# Patient Record
Sex: FEMALE | Race: WHITE | Employment: FULL TIME | RURAL
[De-identification: names, ages, dates, MRNs, and addresses within clinical notes are randomized per-mention and may not be internally consistent; named-entity substitution may affect disease eponyms.]

---

## 2020-01-28 ENCOUNTER — HOSPITAL ENCOUNTER (OUTPATIENT)
Dept: CT IMAGING | Facility: HOSPITAL | Age: 40
Discharge: HOME | End: 2020-01-28
Payer: COMMERCIAL

## 2020-01-28 PROCEDURE — 74150 CT ABDOMEN W/O CONTRAST: CPT

## 2020-12-17 ENCOUNTER — PREP FOR SURGERY (OUTPATIENT)
Dept: OTHER | Facility: HOSPITAL | Age: 40
End: 2020-12-17

## 2020-12-17 ENCOUNTER — APPOINTMENT (OUTPATIENT)
Dept: PREADMISSION TESTING | Facility: HOSPITAL | Age: 40
End: 2020-12-17

## 2020-12-17 ENCOUNTER — TELEPHONE (OUTPATIENT)
Dept: OBSTETRICS AND GYNECOLOGY | Facility: CLINIC | Age: 40
End: 2020-12-17

## 2020-12-17 ENCOUNTER — OFFICE VISIT (OUTPATIENT)
Dept: OBSTETRICS AND GYNECOLOGY | Facility: CLINIC | Age: 40
End: 2020-12-17

## 2020-12-17 VITALS
WEIGHT: 280 LBS | DIASTOLIC BLOOD PRESSURE: 76 MMHG | BODY MASS INDEX: 47.8 KG/M2 | SYSTOLIC BLOOD PRESSURE: 138 MMHG | HEIGHT: 64 IN

## 2020-12-17 VITALS — WEIGHT: 281.8 LBS | BODY MASS INDEX: 48.11 KG/M2 | HEIGHT: 64 IN

## 2020-12-17 DIAGNOSIS — E66.9 OBESITY (BMI 30-39.9): ICD-10-CM

## 2020-12-17 DIAGNOSIS — N94.6 DYSMENORRHEA: ICD-10-CM

## 2020-12-17 DIAGNOSIS — N92.1 MENORRHAGIA WITH IRREGULAR CYCLE: ICD-10-CM

## 2020-12-17 DIAGNOSIS — N95.1 MENOPAUSAL SYMPTOMS: ICD-10-CM

## 2020-12-17 DIAGNOSIS — R93.5 ABNORMAL ULTRASOUND OF ENDOMETRIUM: ICD-10-CM

## 2020-12-17 DIAGNOSIS — N92.1 MENORRHAGIA WITH IRREGULAR CYCLE: Primary | ICD-10-CM

## 2020-12-17 LAB
BASOPHILS # BLD AUTO: 0.12 10*3/MM3 (ref 0–0.2)
BASOPHILS NFR BLD AUTO: 0.8 % (ref 0–1.5)
DEPRECATED RDW RBC AUTO: 43.6 FL (ref 37–54)
EOSINOPHIL # BLD AUTO: 0.49 10*3/MM3 (ref 0–0.4)
EOSINOPHIL NFR BLD AUTO: 3.3 % (ref 0.3–6.2)
ERYTHROCYTE [DISTWIDTH] IN BLOOD BY AUTOMATED COUNT: 14.5 % (ref 12.3–15.4)
HCT VFR BLD AUTO: 30.5 % (ref 34–46.6)
HGB BLD-MCNC: 9.7 G/DL (ref 12–15.9)
IMM GRANULOCYTES # BLD AUTO: 0.11 10*3/MM3 (ref 0–0.05)
IMM GRANULOCYTES NFR BLD AUTO: 0.7 % (ref 0–0.5)
LYMPHOCYTES # BLD AUTO: 4.23 10*3/MM3 (ref 0.7–3.1)
LYMPHOCYTES NFR BLD AUTO: 28.1 % (ref 19.6–45.3)
MCH RBC QN AUTO: 26.2 PG (ref 26.6–33)
MCHC RBC AUTO-ENTMCNC: 31.8 G/DL (ref 31.5–35.7)
MCV RBC AUTO: 82.4 FL (ref 79–97)
MONOCYTES # BLD AUTO: 0.81 10*3/MM3 (ref 0.1–0.9)
MONOCYTES NFR BLD AUTO: 5.4 % (ref 5–12)
NEUTROPHILS NFR BLD AUTO: 61.7 % (ref 42.7–76)
NEUTROPHILS NFR BLD AUTO: 9.27 10*3/MM3 (ref 1.7–7)
NRBC BLD AUTO-RTO: 0 /100 WBC (ref 0–0.2)
PLATELET # BLD AUTO: 733 10*3/MM3 (ref 140–450)
PMV BLD AUTO: 10.2 FL (ref 6–12)
RBC # BLD AUTO: 3.7 10*6/MM3 (ref 3.77–5.28)
WBC # BLD AUTO: 15.03 10*3/MM3 (ref 3.4–10.8)

## 2020-12-17 PROCEDURE — U0004 COV-19 TEST NON-CDC HGH THRU: HCPCS

## 2020-12-17 PROCEDURE — 99204 OFFICE O/P NEW MOD 45 MIN: CPT | Performed by: OBSTETRICS & GYNECOLOGY

## 2020-12-17 PROCEDURE — 85025 COMPLETE CBC W/AUTO DIFF WBC: CPT

## 2020-12-17 PROCEDURE — 81003 URINALYSIS AUTO W/O SCOPE: CPT

## 2020-12-17 PROCEDURE — C9803 HOPD COVID-19 SPEC COLLECT: HCPCS

## 2020-12-17 PROCEDURE — 36415 COLL VENOUS BLD VENIPUNCTURE: CPT

## 2020-12-17 PROCEDURE — 81025 URINE PREGNANCY TEST: CPT

## 2020-12-17 RX ORDER — SODIUM CHLORIDE 0.9 % (FLUSH) 0.9 %
10 SYRINGE (ML) INJECTION AS NEEDED
Status: CANCELLED | OUTPATIENT
Start: 2020-12-17

## 2020-12-17 RX ORDER — SODIUM CHLORIDE 0.9 % (FLUSH) 0.9 %
3 SYRINGE (ML) INJECTION EVERY 12 HOURS SCHEDULED
Status: CANCELLED | OUTPATIENT
Start: 2020-12-17

## 2020-12-17 NOTE — DISCHARGE INSTRUCTIONS

## 2020-12-17 NOTE — PROGRESS NOTES
Subjective  Chief Complaint   Patient presents with   • Menorrhagia     Patient complains of heavy menstrual cycle, advised changing pad and tampon every hour.      Patient is 40 y.o.  here as a new patient for evaluation of heavy and irregular menses.  Patient reports losing approximately 55 pounds since 2019.  Patient has done this on her own through diet.  Patient reports prior to this time she would go for several months without a menstrual cycle.  Patient reports now since that time she will have menstrual cycles monthly or occasionally every 2 weeks.  Patient reports her cycles will vary in the length of time from 3 to 4 days up to a week or more.  Patient has had an episode of bleeding lasting for 2 weeks.  Patient reports her menses are heavy passing large clots.  She will change a tampon and maxi pad every 1-2 hours.  She also reports having severe pain associated with her menstrual cycles.  Patient reports the onset of symptoms again over the last year.  Patient is currently not on any contraception.  She has not been on any birth control for over 20 years.  Patient has had 1 vaginal delivery.  Patient lost her 18-year-old daughter in  secondary to an MVA.  Patient does report having spherocytosis as well.  She was diagnosed at the age of 3.  She had a splenectomy.  Patient has done well since that time.  Patient has had a D&C in the past secondary to menorrhagia.  Patient reports benign pathology.  Patient reports her last Pap smear was over 5 years ago.  She reports her Pap smears have been normal.  Patient denies being dizzy or lightheaded.  Patient reports her last menstrual cycle started on .  Patient reports the bleeding has been extremely heavy passing large clots as well as changing a pad and tampon every hour.  The patient does report having occasional hot flashes as well as night sweats.  She has not had any recent laboratory assessment.  The patient denies any  "family history of GYN malignancy.    History  Past Medical History:   Diagnosis Date   • Female infertility    • Migraine    • Spherocytosis (CMS/HCC)    • Urinary tract infection      No current outpatient medications on file prior to visit.     No current facility-administered medications on file prior to visit.      No Known Allergies  Past Surgical History:   Procedure Laterality Date   • DILATATION AND CURETTAGE     • GALLBLADDER SURGERY     • SPLENECTOMY       History reviewed. No pertinent family history.  Social History     Socioeconomic History   • Marital status: Single     Spouse name: Not on file   • Number of children: Not on file   • Years of education: Not on file   • Highest education level: Not on file   Social Needs   • Financial resource strain: Patient refused   • Food insecurity     Worry: Patient refused     Inability: Patient refused   • Transportation needs     Medical: Not on file     Non-medical: Not on file   Tobacco Use   • Smoking status: Never Smoker   • Smokeless tobacco: Never Used   Substance and Sexual Activity   • Alcohol use: Never     Frequency: Never   • Drug use: Never   • Sexual activity: Yes     Partners: Male     Birth control/protection: None   Lifestyle   • Physical activity     Days per week: Patient refused     Minutes per session: Patient refused   • Stress: Patient refused       Review of Systems  All systems were reviewed and negative except for:  Constitution:  positive for weight gain  Genitourinary: postivie for  abnormal menstrual bleeding  Objective  Vitals:    12/17/20 1039   BP: 138/76   Weight: 127 kg (280 lb)   Height: 162.6 cm (64\")     Physical Exam:  General Appearance: alert, appears stated age and cooperative  Head: normocephalic, without obvious abnormality and atraumatic  Eyes: lids and lashes normal, conjunctivae and sclerae normal, no icterus, no pallor, corneas clear and PERRLA  Ears: ears appear intact with no abnormalities noted  Nose: nares " normal, septum midline, mucosa normal and no drainage  Neck: suppple, trachea midline and no thyromegaly  Lungs: clear to auscultation, respirations regular, respirations even and respirations unlabored  Heart: regular rhythm and normal rate, normal S1, S2, no murmur, gallop, or rubs and no click  Breasts: Not performed.  Abdomen: normal bowel sounds, no masses, no hepatomegaly, no splenomegaly, soft non-tender, no guarding and no rebound tenderness  Pelvic: Not performed.  Extremities: moves extremities well, no edema, no cyanosis and no redness  Skin: no bleeding, bruising or rash and no lesions noted  Lymph Nodes: no palpable adenopathy  Neuro: CN II-X grossly intact; sensation intact  Psych: normal mood and affect, oriented to person, time and place, thought content organized and appropriate judgment  Lab Review   No data reviewed    Imaging   Pelvic ultrasound report  Pelvic ultrasound images independantly reviewed; see report as noted  US Non-ob Transvaginal  Destiny Soto  : 1980  MRN: [id number]  Date: 2020    Reason for exam/History: Menorrhagia    The ultrasound images are reviewed.  The uterus is anteverted in position.    The uterus appears normal.  The endometrium is markedly thickened and   heterogeneous.  The endometrium measures 30 mms.  The bilateral ovaries   are normal in appearance.  The ovaries are difficult to image however.    There is normal vascular flow noted.  There is no free fluid noted.    The exam limitations noted:  image quality    See ultrasound report for measurements and structures identified.    Jacquelin Hogue MD, [facility]  OB GYN [place]    Decision to Obtain Medical Records  No    Summary of Medical Records  No    Assessment/Plan  1. Menorrhagia with irregular cycle  Patient with new onset menorrhagia with irregular cycles.  Patient with morbid obesity and a history of oligomenorrhea.  Transvaginal ultrasound was obtained today.  The  endometrium is noted to be markedly thickened.  I discussed with the patient the need for endometrial sampling.  I discussed with the patient and endometrial biopsy versus D&C.  Patient desires to proceed with D&C and diagnostic hysteroscopy.  Plan pending results.  - US Non-ob Transvaginal    2. Abnormal ultrasound of endometrium  Patient with markedly thickened endometrium on transvaginal ultrasound.  I have discussed with the patient the various etiologies for an abnormal endometrium.  I recommend further evaluation with a D&C and diagnostic hysteroscopy.  I discussed with the patient the risk, complications, benefits, as well as other alternatives.  Patient to schedule as noted.    3. Dysmenorrhea  Destiny Soto was counseled regarding the various etiologies for dysmenorrhea.  The patient was informed that primary dysmenorrhea is painful menstruation in the absence of pathology.  The various options for dysmenorrhea were discussed to include nonsteroidal antiinflammatory drugs, hormonal suppression, or both.  The patient was informed secondary dysmenorrhea is a result of pelvic pathology and is more common in patients with severe dysmenorrhea at menarche or progressively worsening dysmenorrhea, abnormal uterine bleeding, mid-cycle or acyclic pain, infertility, family history of endometriosis, dyspareunia, or lack of response to empiric therapy.  Evaluation for secondary causes includes pelvic ultrasonography and possible laparoscopy.  The various treatment options for secondary dysmenorrhea depends upon the etiology as discussed.  Transvaginal ultrasound was obtained as noted.  We will proceed with D&C and diagnostic hysteroscopy for further evaluation.  Plan pending results.    4. Obesity (BMI 30-39.9)  Patient with a history of oligomenorrhea and morbid obesity.  Patient with recent weight loss as noted.  I discussed with the patient her increased risk of uterine hyperplasia as well as malignancy.  We will  schedule D&C with diagnostic hysteroscopy for further evaluation.    5. Menopausal symptoms  The various options for the management of menopausal symptoms was discussed.  The medical treatment options discussed include HRT, SSRIs, SSNRIs, clonidine, and gabapentin.  The risks and benefits were discussed including the findings from the WHI study.  The increased risk of breast cancer, CAD, stroke, and VTE events were discussed for combination therapy vs the increased risk of CV events and breast cancer not being seen in the estrogen only group.  The lowest effective dose for the shortest duration of treatment was discussed in regards to HRT.  Other alternatives including otc supplements and lifestyle changes were also discussed.  Local estrogen therapy to relieve atrophic vaginal symptoms was discussed was well as other alternatives.  Will obtain labs at the time of her preop testing as discussed.  Plan pending results.         Follow up as discussed/scheduled  Note: Speech recognition transcription software may have been used to dictate portions of this document.  An attempt at proofreading has been made though minor errors in transcription may still be present.  This note was electronically signed.  Jacquelin Hogue M.D.

## 2020-12-17 NOTE — TELEPHONE ENCOUNTER
----- Message from Chelsea Frost sent at 12/17/2020  2:32 PM EST -----  Regarding: pre asmissions PRE OP ORDER  Patient gave a urine on her pre op today and it spilled.    Is it okay to collect it on Monday the 21st , Patient lives out of town.    Thanks,  Chelsea

## 2020-12-18 ENCOUNTER — LAB (OUTPATIENT)
Dept: LAB | Facility: HOSPITAL | Age: 40
End: 2020-12-18

## 2020-12-18 LAB
B-HCG UR QL: NEGATIVE
BILIRUB UR QL STRIP: NEGATIVE
CLARITY UR: CLEAR
COLOR UR: YELLOW
DEPRECATED RDW RBC AUTO: 43.5 FL (ref 37–54)
ERYTHROCYTE [DISTWIDTH] IN BLOOD BY AUTOMATED COUNT: 14.6 % (ref 12.3–15.4)
GLUCOSE UR STRIP-MCNC: NEGATIVE MG/DL
HCT VFR BLD AUTO: 28.2 % (ref 34–46.6)
HGB BLD-MCNC: 9 G/DL (ref 12–15.9)
HGB UR QL STRIP.AUTO: NEGATIVE
KETONES UR QL STRIP: NEGATIVE
LEUKOCYTE ESTERASE UR QL STRIP.AUTO: NEGATIVE
MCH RBC QN AUTO: 26.2 PG (ref 26.6–33)
MCHC RBC AUTO-ENTMCNC: 31.9 G/DL (ref 31.5–35.7)
MCV RBC AUTO: 82.2 FL (ref 79–97)
NITRITE UR QL STRIP: NEGATIVE
PH UR STRIP.AUTO: 7 [PH] (ref 5–8)
PLATELET # BLD AUTO: 736 10*3/MM3 (ref 140–450)
PMV BLD AUTO: 9.9 FL (ref 6–12)
PROT UR QL STRIP: NEGATIVE
RBC # BLD AUTO: 3.43 10*6/MM3 (ref 3.77–5.28)
SARS-COV-2 RNA RESP QL NAA+PROBE: NOT DETECTED
SP GR UR STRIP: 1.01 (ref 1–1.03)
UROBILINOGEN UR QL STRIP: NORMAL
WBC # BLD AUTO: 13.13 10*3/MM3 (ref 3.4–10.8)

## 2020-12-18 PROCEDURE — 85027 COMPLETE CBC AUTOMATED: CPT | Performed by: OBSTETRICS & GYNECOLOGY

## 2020-12-18 PROCEDURE — 81003 URINALYSIS AUTO W/O SCOPE: CPT | Performed by: OBSTETRICS & GYNECOLOGY

## 2020-12-18 PROCEDURE — 81025 URINE PREGNANCY TEST: CPT | Performed by: OBSTETRICS & GYNECOLOGY

## 2020-12-19 PROCEDURE — S0260 H&P FOR SURGERY: HCPCS | Performed by: OBSTETRICS & GYNECOLOGY

## 2020-12-21 ENCOUNTER — ANESTHESIA EVENT (OUTPATIENT)
Dept: PERIOP | Facility: HOSPITAL | Age: 40
End: 2020-12-21

## 2020-12-21 ENCOUNTER — ANESTHESIA (OUTPATIENT)
Dept: PERIOP | Facility: HOSPITAL | Age: 40
End: 2020-12-21

## 2020-12-21 ENCOUNTER — HOSPITAL ENCOUNTER (OUTPATIENT)
Facility: HOSPITAL | Age: 40
Setting detail: HOSPITAL OUTPATIENT SURGERY
Discharge: HOME | End: 2020-12-21
Attending: OBSTETRICS & GYNECOLOGY | Admitting: OBSTETRICS & GYNECOLOGY

## 2020-12-21 VITALS
RESPIRATION RATE: 20 BRPM | TEMPERATURE: 97.9 F | DIASTOLIC BLOOD PRESSURE: 80 MMHG | SYSTOLIC BLOOD PRESSURE: 140 MMHG | HEART RATE: 67 BPM | OXYGEN SATURATION: 97 %

## 2020-12-21 DIAGNOSIS — N92.1 MENORRHAGIA WITH IRREGULAR CYCLE: ICD-10-CM

## 2020-12-21 LAB
ESTRADIOL SERPL HS-MCNC: 27.3 PG/ML
FSH SERPL-ACNC: 5.9 MIU/ML

## 2020-12-21 PROCEDURE — 25010000002 ONDANSETRON PER 1 MG: Performed by: NURSE ANESTHETIST, CERTIFIED REGISTERED

## 2020-12-21 PROCEDURE — 82670 ASSAY OF TOTAL ESTRADIOL: CPT | Performed by: OBSTETRICS & GYNECOLOGY

## 2020-12-21 PROCEDURE — 25010000003 LIDOCAINE 1 % SOLUTION: Performed by: OBSTETRICS & GYNECOLOGY

## 2020-12-21 PROCEDURE — 0 LIDOCAINE 1 % SOLUTION: Performed by: OBSTETRICS & GYNECOLOGY

## 2020-12-21 PROCEDURE — 25010000002 PROPOFOL 10 MG/ML EMULSION: Performed by: NURSE ANESTHETIST, CERTIFIED REGISTERED

## 2020-12-21 PROCEDURE — 84402 ASSAY OF FREE TESTOSTERONE: CPT | Performed by: OBSTETRICS & GYNECOLOGY

## 2020-12-21 PROCEDURE — 25010000002 FENTANYL CITRATE (PF) 100 MCG/2ML SOLUTION: Performed by: NURSE ANESTHETIST, CERTIFIED REGISTERED

## 2020-12-21 PROCEDURE — 25010000002 MIDAZOLAM PER 1MG: Performed by: NURSE ANESTHETIST, CERTIFIED REGISTERED

## 2020-12-21 PROCEDURE — 83001 ASSAY OF GONADOTROPIN (FSH): CPT | Performed by: OBSTETRICS & GYNECOLOGY

## 2020-12-21 PROCEDURE — 58558 HYSTEROSCOPY BIOPSY: CPT | Performed by: OBSTETRICS & GYNECOLOGY

## 2020-12-21 PROCEDURE — 84403 ASSAY OF TOTAL TESTOSTERONE: CPT | Performed by: OBSTETRICS & GYNECOLOGY

## 2020-12-21 PROCEDURE — 25010000002 FENTANYL CITRATE (PF) 50 MCG/ML SOLUTION: Performed by: NURSE ANESTHETIST, CERTIFIED REGISTERED

## 2020-12-21 RX ORDER — PROPOFOL 10 MG/ML
VIAL (ML) INTRAVENOUS AS NEEDED
Status: DISCONTINUED | OUTPATIENT
Start: 2020-12-21 | End: 2020-12-21 | Stop reason: SURG

## 2020-12-21 RX ORDER — ONDANSETRON 2 MG/ML
INJECTION INTRAMUSCULAR; INTRAVENOUS AS NEEDED
Status: DISCONTINUED | OUTPATIENT
Start: 2020-12-21 | End: 2020-12-21 | Stop reason: SURG

## 2020-12-21 RX ORDER — SODIUM CHLORIDE 0.9 % (FLUSH) 0.9 %
3 SYRINGE (ML) INJECTION EVERY 12 HOURS SCHEDULED
Status: DISCONTINUED | OUTPATIENT
Start: 2020-12-21 | End: 2020-12-21 | Stop reason: HOSPADM

## 2020-12-21 RX ORDER — ACETAMINOPHEN 500 MG
1000 TABLET ORAL EVERY 8 HOURS PRN
Qty: 60 TABLET | Refills: 0 | Status: SHIPPED | OUTPATIENT
Start: 2020-12-21 | End: 2021-01-18 | Stop reason: HOSPADM

## 2020-12-21 RX ORDER — ONDANSETRON HYDROCHLORIDE 8 MG/1
8 TABLET, FILM COATED ORAL EVERY 8 HOURS PRN
Qty: 12 TABLET | Refills: 0 | Status: SHIPPED | OUTPATIENT
Start: 2020-12-21 | End: 2021-01-15

## 2020-12-21 RX ORDER — ONDANSETRON 2 MG/ML
4 INJECTION INTRAMUSCULAR; INTRAVENOUS ONCE AS NEEDED
Status: DISCONTINUED | OUTPATIENT
Start: 2020-12-21 | End: 2020-12-21 | Stop reason: HOSPADM

## 2020-12-21 RX ORDER — SODIUM CHLORIDE 0.9 % (FLUSH) 0.9 %
10 SYRINGE (ML) INJECTION AS NEEDED
Status: DISCONTINUED | OUTPATIENT
Start: 2020-12-21 | End: 2020-12-21 | Stop reason: HOSPADM

## 2020-12-21 RX ORDER — KETAMINE HYDROCHLORIDE 50 MG/ML
INJECTION, SOLUTION, CONCENTRATE INTRAMUSCULAR; INTRAVENOUS AS NEEDED
Status: DISCONTINUED | OUTPATIENT
Start: 2020-12-21 | End: 2020-12-21 | Stop reason: SURG

## 2020-12-21 RX ORDER — PROMETHAZINE HYDROCHLORIDE 12.5 MG/1
12.5 TABLET ORAL ONCE AS NEEDED
Status: DISCONTINUED | OUTPATIENT
Start: 2020-12-21 | End: 2020-12-21 | Stop reason: HOSPADM

## 2020-12-21 RX ORDER — FENTANYL CITRATE 50 UG/ML
INJECTION, SOLUTION INTRAMUSCULAR; INTRAVENOUS AS NEEDED
Status: DISCONTINUED | OUTPATIENT
Start: 2020-12-21 | End: 2020-12-21 | Stop reason: SURG

## 2020-12-21 RX ORDER — IBUPROFEN 800 MG/1
800 TABLET ORAL EVERY 8 HOURS PRN
Qty: 30 TABLET | Refills: 0 | Status: SHIPPED | OUTPATIENT
Start: 2020-12-21 | End: 2021-01-18 | Stop reason: HOSPADM

## 2020-12-21 RX ORDER — MIDAZOLAM HYDROCHLORIDE 2 MG/2ML
INJECTION, SOLUTION INTRAMUSCULAR; INTRAVENOUS AS NEEDED
Status: DISCONTINUED | OUTPATIENT
Start: 2020-12-21 | End: 2020-12-21 | Stop reason: SURG

## 2020-12-21 RX ORDER — LIDOCAINE HYDROCHLORIDE 10 MG/ML
INJECTION, SOLUTION INFILTRATION; PERINEURAL AS NEEDED
Status: DISCONTINUED | OUTPATIENT
Start: 2020-12-21 | End: 2020-12-21 | Stop reason: HOSPADM

## 2020-12-21 RX ORDER — LIDOCAINE HYDROCHLORIDE 20 MG/ML
INJECTION, SOLUTION INTRAVENOUS AS NEEDED
Status: DISCONTINUED | OUTPATIENT
Start: 2020-12-21 | End: 2020-12-21 | Stop reason: SURG

## 2020-12-21 RX ORDER — HYDROCODONE BITARTRATE AND ACETAMINOPHEN 5; 325 MG/1; MG/1
1 TABLET ORAL ONCE AS NEEDED
Status: DISCONTINUED | OUTPATIENT
Start: 2020-12-21 | End: 2020-12-21 | Stop reason: HOSPADM

## 2020-12-21 RX ORDER — ONDANSETRON 4 MG/1
4 TABLET, FILM COATED ORAL ONCE AS NEEDED
Status: DISCONTINUED | OUTPATIENT
Start: 2020-12-21 | End: 2020-12-21 | Stop reason: HOSPADM

## 2020-12-21 RX ORDER — SODIUM CHLORIDE, SODIUM LACTATE, POTASSIUM CHLORIDE, CALCIUM CHLORIDE 600; 310; 30; 20 MG/100ML; MG/100ML; MG/100ML; MG/100ML
1000 INJECTION, SOLUTION INTRAVENOUS CONTINUOUS
Status: DISCONTINUED | OUTPATIENT
Start: 2020-12-21 | End: 2020-12-21 | Stop reason: HOSPADM

## 2020-12-21 RX ADMIN — KETAMINE HYDROCHLORIDE 10 MG: 50 INJECTION, SOLUTION INTRAMUSCULAR; INTRAVENOUS at 13:45

## 2020-12-21 RX ADMIN — FENTANYL CITRATE 25 MCG: 50 INJECTION INTRAMUSCULAR; INTRAVENOUS at 13:32

## 2020-12-21 RX ADMIN — FENTANYL CITRATE 25 MCG: 50 INJECTION INTRAMUSCULAR; INTRAVENOUS at 13:25

## 2020-12-21 RX ADMIN — MIDAZOLAM HYDROCHLORIDE 2 MG: 1 INJECTION, SOLUTION INTRAMUSCULAR; INTRAVENOUS at 13:25

## 2020-12-21 RX ADMIN — KETAMINE HYDROCHLORIDE 30 MG: 50 INJECTION, SOLUTION INTRAMUSCULAR; INTRAVENOUS at 13:30

## 2020-12-21 RX ADMIN — LIDOCAINE HYDROCHLORIDE 80 MG: 20 INJECTION, SOLUTION INTRAVENOUS at 13:30

## 2020-12-21 RX ADMIN — PROPOFOL 70 MG: 10 INJECTION, EMULSION INTRAVENOUS at 13:30

## 2020-12-21 RX ADMIN — SODIUM CHLORIDE, POTASSIUM CHLORIDE, SODIUM LACTATE AND CALCIUM CHLORIDE 1000 ML: 600; 310; 30; 20 INJECTION, SOLUTION INTRAVENOUS at 12:12

## 2020-12-21 RX ADMIN — PROPOFOL 150 MCG/KG/MIN: 10 INJECTION, EMULSION INTRAVENOUS at 13:30

## 2020-12-21 RX ADMIN — ONDANSETRON 4 MG: 2 INJECTION INTRAMUSCULAR; INTRAVENOUS at 13:30

## 2020-12-21 RX ADMIN — FENTANYL CITRATE 50 MCG: 50 INJECTION INTRAMUSCULAR; INTRAVENOUS at 13:42

## 2020-12-22 LAB
TESTOST FREE SERPL-MCNC: 3.9 PG/ML (ref 0–4.2)
TESTOST SERPL-MCNC: 32 NG/DL (ref 8–48)

## 2020-12-24 ENCOUNTER — TELEPHONE (OUTPATIENT)
Dept: OBSTETRICS AND GYNECOLOGY | Facility: CLINIC | Age: 40
End: 2020-12-24

## 2020-12-29 LAB
LAB AP CASE REPORT: NORMAL
PATH REPORT.FINAL DX SPEC: NORMAL

## 2020-12-30 ENCOUNTER — OFFICE VISIT (OUTPATIENT)
Dept: OBSTETRICS AND GYNECOLOGY | Facility: CLINIC | Age: 40
End: 2020-12-30

## 2020-12-30 VITALS
SYSTOLIC BLOOD PRESSURE: 132 MMHG | HEIGHT: 64 IN | DIASTOLIC BLOOD PRESSURE: 80 MMHG | WEIGHT: 286 LBS | BODY MASS INDEX: 48.83 KG/M2

## 2020-12-30 DIAGNOSIS — Z09 POSTOPERATIVE FOLLOW-UP: Primary | ICD-10-CM

## 2020-12-30 DIAGNOSIS — C54.1 ENDOMETRIAL CANCER (HCC): ICD-10-CM

## 2020-12-30 DIAGNOSIS — D50.0 IRON DEFICIENCY ANEMIA DUE TO CHRONIC BLOOD LOSS: ICD-10-CM

## 2020-12-30 PROCEDURE — 99024 POSTOP FOLLOW-UP VISIT: CPT | Performed by: PHYSICIAN ASSISTANT

## 2020-12-30 RX ORDER — FERROUS SULFATE 325(65) MG
325 TABLET ORAL 2 TIMES DAILY WITH MEALS
Qty: 60 TABLET | Refills: 1 | Status: SHIPPED | OUTPATIENT
Start: 2020-12-30

## 2020-12-30 NOTE — PROGRESS NOTES
Subjective   Chief Complaint   Patient presents with   • Post-op     9 days post-op D&C Hysteroscopy, doing well       Destiny Soto is a 40 y.o. year old  presenting to be seen for post op visit  She is doing well 9 days post op D&C hysteroscopy. Reports having normal bowel and bladder function  Has had light vaginal spotting off and on since procedure  Pathology notes endometrioid carcinoma FIGO 2  Reports she is still feeling somewhat weak. She is not taking iron supplement. Hct 28 preoperatively       Past Medical History:   Diagnosis Date   • Female infertility    • Migraine    • Spherocytosis (CMS/HCC)    • Urinary tract infection         Current Outpatient Medications:   •  ibuprofen (ADVIL,MOTRIN) 800 MG tablet, Take 1 tablet by mouth Every 8 (Eight) Hours As Needed for Mild Pain ., Disp: 30 tablet, Rfl: 0  •  acetaminophen (TYLENOL) 500 MG tablet, Take 2 tablets by mouth Every 8 (Eight) Hours As Needed for Mild or Moderate Pain ., Disp: 60 tablet, Rfl: 0  •  ferrous sulfate 325 (65 FE) MG tablet, Take 1 tablet by mouth 2 (Two) Times a Day With Meals., Disp: 60 tablet, Rfl: 1  •  ondansetron (ZOFRAN) 8 MG tablet, Take 1 tablet by mouth Every 8 (Eight) Hours As Needed for Nausea or Vomiting., Disp: 12 tablet, Rfl: 0   No Known Allergies   Past Surgical History:   Procedure Laterality Date   • D&C HYSTEROSCOPY N/A 2020    Procedure: DILATATION AND CURETTAGE  DIAGNOSTIC HYSTEROSCOPY;  Surgeon: Jacquelin Hogue MD;  Location: [facility];  Service: Obstetrics/Gynecology;  Laterality: N/A;   • DILATATION AND CURETTAGE     • GALLBLADDER SURGERY     • SPLENECTOMY        Social History     Socioeconomic History   • Marital status: Single     Spouse name: Not on file   • Number of children: Not on file   • Years of education: Not on file   • Highest education level: Not on file   Social Needs   • Financial resource strain: Patient refused   • Food insecurity     Worry: Patient refused     Inability: Patient  "refused   • Transportation needs     Medical: Not on file     Non-medical: Not on file   Tobacco Use   • Smoking status: Never Smoker   • Smokeless tobacco: Never Used   Substance and Sexual Activity   • Alcohol use: Never     Frequency: Never   • Drug use: Never   • Sexual activity: Yes     Partners: Male     Birth control/protection: None   Lifestyle   • Physical activity     Days per week: Patient refused     Minutes per session: Patient refused   • Stress: Patient refused      History reviewed. No pertinent family history.    Review of Systems   Constitutional: Positive for fatigue. Negative for chills, diaphoresis and fever.   Gastrointestinal: Negative for abdominal pain, constipation, diarrhea, nausea and vomiting.   Genitourinary: Negative for difficulty urinating, dysuria, pelvic pain and vaginal discharge.           Objective   /80   Ht 162.6 cm (64\")   Wt 130 kg (286 lb)   LMP 12/13/2020 (Approximate)   Breastfeeding No   BMI 49.09 kg/m²     Physical Exam         Assessment and Plan  Diagnoses and all orders for this visit:    1. Postoperative follow-up (Primary)    2. Endometrial cancer (CMS/HCC)  -     Ambulatory Referral to Gynecologic Oncology    3. Iron deficiency anemia due to chronic blood loss    Other orders  -     ferrous sulfate 325 (65 FE) MG tablet; Take 1 tablet by mouth 2 (Two) Times a Day With Meals.  Dispense: 60 tablet; Refill: 1      Patient Instructions   Refer GYN oncology  Recommend starting iron replacement bid with meals             This note was electronically signed.    Val Kc PA-C   December 30, 2020  "

## 2021-01-06 ENCOUNTER — PATIENT EDUCATION (SURGERY INSTRUCTIONS) (OUTPATIENT)
Dept: GYNECOLOGIC ONCOLOGY | Facility: CLINIC | Age: 41
End: 2021-01-06

## 2021-01-06 ENCOUNTER — OFFICE VISIT (OUTPATIENT)
Dept: GYNECOLOGIC ONCOLOGY | Facility: CLINIC | Age: 41
End: 2021-01-06

## 2021-01-06 VITALS
SYSTOLIC BLOOD PRESSURE: 145 MMHG | WEIGHT: 286 LBS | BODY MASS INDEX: 48.83 KG/M2 | HEART RATE: 82 BPM | DIASTOLIC BLOOD PRESSURE: 78 MMHG | TEMPERATURE: 96.4 F | HEIGHT: 64 IN | RESPIRATION RATE: 18 BRPM | OXYGEN SATURATION: 97 %

## 2021-01-06 DIAGNOSIS — F41.9 ANXIETY: ICD-10-CM

## 2021-01-06 DIAGNOSIS — C54.1 ENDOMETRIAL CANCER (HCC): Primary | ICD-10-CM

## 2021-01-06 DIAGNOSIS — E66.01 CLASS 3 SEVERE OBESITY DUE TO EXCESS CALORIES WITHOUT SERIOUS COMORBIDITY WITH BODY MASS INDEX (BMI) OF 45.0 TO 49.9 IN ADULT (HCC): ICD-10-CM

## 2021-01-06 DIAGNOSIS — F32.9 REACTIVE DEPRESSION: ICD-10-CM

## 2021-01-06 DIAGNOSIS — N92.1 MENORRHAGIA WITH IRREGULAR CYCLE: ICD-10-CM

## 2021-01-06 PROCEDURE — 99205 OFFICE O/P NEW HI 60 MIN: CPT | Performed by: OBSTETRICS & GYNECOLOGY

## 2021-01-06 RX ORDER — CELECOXIB 100 MG/1
200 CAPSULE ORAL ONCE
Status: CANCELLED | OUTPATIENT
Start: 2021-01-06 | End: 2021-01-06

## 2021-01-06 RX ORDER — ACETAMINOPHEN 325 MG/1
650 TABLET ORAL ONCE
Status: CANCELLED | OUTPATIENT
Start: 2021-01-06 | End: 2021-01-06

## 2021-01-06 NOTE — PATIENT INSTRUCTIONS
Laparoscopic Surgery Instructions            Destiny Soto  [id number]  1980      SURGEON:  Ruth Mistry MD    Surgery Coordinator: Ashley   If you have any questions before or after surgery please call.  [phone]       Appointment    1. You have COVID testing on 01/15/2021 at 2:30 PM This is located at [address] [address] located in the lower level of the building ( basement ). Upon arrival please park in the designated parking spaces located to the left of the building. Once parked, please stay in your car and call [phone]. A member of the clinic will conduct your screening before leaving your vehicle.     2. You have pre-admission testing (PAT) appointment on 01/15/2021 at 3:30 PM.You will need to be at hospital registration 10 minutes before that time. The registration department is located in the long hallway between the [facility] and [address] [place] [address].      3.  Your surgery has been scheduled on 01/18/2021.  You will need to be at the [address] [place] [address] second floor surgery registration on that day at 08:00 AM    The Day(s) Before Surgery     ·  Nothing by mouth after midnight on 01/17/2021.    · Plan to have someone take you home from the hospital.    · Do not use any products that contain nicotine or tobacco, such as cigarettes and e-cigarettes. These can delay healing after surgery. If you need help quitting, ask your health care provider.    ·  Do not take vitamins or aspirin one week before surgery ( if applicable).  On the morning of your surgery, you may take you prescription medications with a sip of water, unless told otherwise by your provider.  Bring them with you to the hospital (Diabetic patient should bring insulin if instructed by the managing physician). If you are taking a blood thinner ( Eliquis, Coumadin, Xarelto, Lovenox, Asprin, Heparin, etc.) please have the provider that manages this instruct you on when to stop taking prior to surgery.     · If you are  feeling sick, have a fever or cough and have seen your PCP let our office know 48 hours prior to surgery. It may be subject to rescheduling.            What can I expect after the procedure?    A normal length of stay for laparoscopic procedures can be same day or up to 23 hours.     After the procedure, it is common to have:  · Pain.  · Soreness and numbness in your incision areas.  · Vaginal bleeding and discharge up to 6 weeks after surgery.  · Constipation.  · Temporary change in bladder function.  · Feelings of sadness or other emotions.  · Small amounts of clear drainage from incisions  · If you are discharged with an abdominal binder, this is to be used as needed for incisional comfort. You may choose to discontinue use at any time.      Follow these instructions at home:  Medicines    · Please take any medications that have been prescribed after your surgery, you may take over the counter Tylenol and Ibuprofen, unless told otherwise by your provider.   · Please take your stool softener as prescribed. If you do not have a bowel movement within 24 hours following surgery try a laxative (milk of magnesia) or you may take Lakesha-Lax.    Activity    · Return to your normal activities as told by your health care provider.   · You may be told to take short walks every day and go farther each time.  · Do not lift anything that is heavier than 20 lbs.      General instructions    · Do not put anything in your vagina for 6 weeks after your surgery or as told by your health care provider. This includes tampons and douches.  · Do not have sex until your health care provider says you can.  · Do not take baths, swim, or use a hot tub until your health care provider approves.  · Drink enough fluid to keep your urine clear or pale yellow.  · Do not drive for 24 hours if you were given a sedative.  · Do not drive while taking Narcotic Pain medication ( oxycodone, hydrocodone, etc.).   · Keep all follow-up visits as told by  your health care provider. This is important. You will have a post op appointment typically 3 weeks after surgery.  · Make sure you are urinating on a scheduled basis, for example every 2 hours. This will help retrain your bladder after surgery and prevent urinary tract infections.     Contact a health care provider if:    · Your pain medicine is not helping.  · You have a fever over 101.0  · You have redness, swelling, or pain at your incision site.  · You continue to have difficulty urinating.  · You have not had a bowel movement 2-3 days after surgery, experience nausea and vomiting, are unable to pass gas.   · Large volumes of drainage or blood from incisions, requiring multiple guaze changes.     Get help right away if:    · You have severe abdominal or back pain that is not controlled with medication.  · You have heavy bleeding from your vagina that saturates a maxi pad within 1-2 hours. Note- vaginal bleeding and spotting is normal up to 6 weeks from a hysterectomy, Heavy Bleeding is not.     If you experience a medical emergency call 911 or have someone drive you to your nearest emergency department.

## 2021-01-06 NOTE — PROGRESS NOTES
Destiny Soto  [id number]  1980      Reason for visit:  Grade 2 endometrial cancer    Consultation:  Patient is being seen at the request of Dr. Jacquelin Hogue and  ISACC Gaitan    History of present illness:  The patient is a 40 y.o. year old female who presents today for treatment and evaluation of the above issues.   She presented to Dr. Hogue in December for menorrhagia and irregular menses. She has a recent 55lb weigh loss through diet and exercise. She had oligomenorrhea prior to her weight loss and reports menses that have become increasingly heavy and frequent. She underwent hysteroscopy with dilation and curettage on 2020 with Dr. Hogue and pathology returned as grade 2 endometrial cancer. She reports light pink spotting since her D&C. She continues to feel weak and take iron supplements. She is somewhat tearful noting her longstanding nfertility issues and that her daughter passed away when she was 16 years old.  This current diagnosis is an additional stressor.  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   For new patients, PFS intake form from today was reviewed and confirmed.    OBGYN History:  She is a , her daughter  in an MVA in . . She does not have a history of abnormal pap smears.      Oncologic History:  Oncology/Hematology History    No history exists.         Past Medical History:   Diagnosis Date   • Endometrial cancer (CMS/HCC)    • Female infertility    • Migraine    • Spherocytosis (CMS/HCC)    • Urinary tract infection        Past Surgical History:   Procedure Laterality Date   • D&C HYSTEROSCOPY N/A 2020    Procedure: DILATATION AND CURETTAGE  DIAGNOSTIC HYSTEROSCOPY;  Surgeon: Jacquelin Hogue MD;  Location: [facility];  Service: Obstetrics/Gynecology;  Laterality: N/A;   • DILATATION AND CURETTAGE     • GALLBLADDER SURGERY     • SPLENECTOMY         MEDICATIONS: The current medication  "list was reviewed with the patient and updated in the EMR this date per the Medical Assistant. Medication dosages and frequencies were confirmed to be accurate.      Allergies:  has No Known Allergies.    Social History:   Social History     Socioeconomic History   • Marital status: Single     Spouse name: Not on file   • Number of children: Not on file   • Years of education: Not on file   • Highest education level: Not on file   Social Needs   • Financial resource strain: Patient refused   • Food insecurity     Worry: Patient refused     Inability: Patient refused   • Transportation needs     Medical: Not on file     Non-medical: Not on file   Tobacco Use   • Smoking status: Never Smoker   • Smokeless tobacco: Never Used   Substance and Sexual Activity   • Alcohol use: Never     Frequency: Never   • Drug use: Never   • Sexual activity: Yes     Partners: Male     Birth control/protection: None   Lifestyle   • Physical activity     Days per week: Patient refused     Minutes per session: Patient refused   • Stress: Patient refused       Family History:    Family History   Problem Relation Age of Onset   • Cancer Mother    mother with hysterectomy due to \"uterine\" cancer at 18 yo.    Health Maintenance:    Health Maintenance   Topic Date Due   • ANNUAL PHYSICAL  10/18/1981   • TDAP/TD VACCINES (1 - Tdap) 10/18/1997   • HEPATITIS C SCREENING  12/17/2020   • PAP SMEAR  12/17/2020   • INFLUENZA VACCINE  Completed   • Pneumococcal Vaccine 0-64  Aged Out   • MENINGOCOCCAL VACCINE  Aged Out     Review of Systems   Constitutional: Negative for appetite change, chills, fatigue, fever and unexpected weight change.        Pain   HENT: Negative for ear discharge, ear pain, hearing loss, mouth sores, nosebleeds, postnasal drip, sinus pressure, sinus pain, sore throat, tinnitus and trouble swallowing.    Eyes: Negative for pain, itching and visual disturbance.   Respiratory: Negative for cough, shortness of breath and wheezing.  " "  Cardiovascular: Negative for chest pain and palpitations.   Gastrointestinal: Negative for abdominal pain, blood in stool, constipation, diarrhea, nausea and vomiting.   Endocrine: Negative for heat intolerance.   Genitourinary: Positive for vaginal bleeding. Negative for difficulty urinating, flank pain, frequency, hematuria, urgency and vaginal discharge.   Musculoskeletal: Negative for arthralgias, gait problem and myalgias.   Skin: Negative for color change, rash and wound.   Allergic/Immunologic: Negative for immunocompromised state.   Neurological: Positive for headaches. Negative for dizziness, seizures, syncope, weakness and numbness.   Hematological: Negative for adenopathy. Does not bruise/bleed easily.   Psychiatric/Behavioral: Positive for dysphoric mood and sleep disturbance. Negative for agitation and confusion. The patient is nervous/anxious.        Physical Exam    Vitals:    01/06/21 0912   BP: 145/78   Pulse: 82   Resp: 18   Temp: 96.4 °F (35.8 °C)   TempSrc: Temporal   SpO2: 97%   Weight: 130 kg (286 lb)   Height: 162.6 cm (64.02\")   PainSc:   5   PainLoc: Groin       Body mass index is 49.07 kg/m².    Wt Readings from Last 3 Encounters:   01/06/21 130 kg (286 lb)   12/30/20 130 kg (286 lb)   12/17/20 128 kg (281 lb 12.8 oz)         GENERAL: Alert, well-appearing female appearing her stated age who is in no apparent distress. Patient is obese by BMI criteria.  HEENT: Sclera anicteric. Head normocephalic, atraumatic. Mucus membranes moist.   NECK: Trachea midline, supple, without masses.  No thyromegaly.   BREASTS: Deferred  CARDIOVASCULAR: Normal rate, regular rhythm, no murmurs, rubs, or gallops.  No peripheral edema.  RESPIRATORY: Clear to auscultation bilaterally, normal respiratory effort  BACK:  No CVA tenderness, no vertebral tenderness on palpation  GASTROINTESTINAL:  Abdomen is soft, non-tender, non-distended, no rebound or guarding, no masses, or hernias. No HSM.  Well healed " splenectomy incision.  Obese abdomen.   SKIN:  Warm, dry, well-perfused.  All visible areas intact.  No rashes, lesions, ulcers.  PSYCHIATRIC: AO x3, with appropriate affect, normal thought processes.  NEUROLOGIC: No focal deficits.  Moves extremities well.  MUSCULOSKELETAL: Normal gait and station.   EXTREMITIES:   No cyanosis, clubbing, symmetric.  LYMPHATICS:  No cervical or inguinal adenopathy noted.     PELVIC exam:    External genitalia are free from lesion. On speculum examination, the cervix was free from lesion. On bimanual examination no mass was appreciated.  Uterus was normal in size and shape. There is no cervical motion or uterine tenderness. No cervical mass was palpated. Parametria were smooth. Rectovaginal exam was deferred.     ECOG PS 0    PROCEDURES:  none    Diagnostic Data:      No Images in the past 120 days found..    Lab Results   Component Value Date    WBC 13.13 (H) 12/18/2020    HGB 9.0 (L) 12/18/2020    HCT 28.2 (L) 12/18/2020    MCV 82.2 12/18/2020     (H) 12/18/2020    NEUTROABS 9.27 (H) 12/17/2020     No results found for:         Assessment/Plan   This is a 40 y.o. woman with biopsy proven grade 2 endometrial cancer, Clinical stage I (TINOSNOMO).    Encounter Diagnoses   Name Primary?   • Endometrial cancer (CMS/HCC) Yes   • Anxiety    • Class 3 severe obesity due to excess calories without serious comorbidity with body mass index (BMI) of 45.0 to 49.9 in adult (CMS/HCC)    • Menorrhagia with irregular cycle    • Reactive depression         Patient was consented for robotic assisted total laparoscopic hysterectomy bilateral salpingo-oophorectomy, sentinel lymph node dissection, possible completion lymphadenectomy.    Risks and benefits of surgery were discussed.  This included, but was not limited to, infection and bleeding like when the skin is cut; damage to surrounding structures; and incisional complications.  Risk of DVT was addressed for major surgeries.  Standard  of care efforts to minimize these risks were reviewed.  Typical hospital stay and recovery were discussed as well as post-procedure precautions.  Surgical implications of chronic illnesses on recovery and surgical outcome were reviewed.     Pain medication regimen for postoperative care was discussed.  Typical regimen and avoidance of narcotics was discussed.  Patient was educated that other factors, such as existing narcotic use, can impact postoperative pain management.      Risks and benefits of lymph node dissection were further discussed.  This included lymphocyst, hematoma, lymphedema, vascular injury, and nerve injury.    Patient verbalized understanding of the plan including the risks and benefits.  Appropriate perioperative testing including laboratory evaluation, EKG as clinically indicated, chest x-ray as clinically indicated, and preadmission evaluation were all ordered as a part of this patient's care.    Obesity and its relationship to endometrial cancers was discussed.  We also discussed other obesity related cancers.  Ongoing weight loss was encouraged.    Patient was tearful at today's visit.  We discussed her diagnosis and the loss of her daughter.  She was receptive to counseling referral.    Typical symptoms of menopause were discussed.  These included hot flashes, vaginal dryness, change in libido, sleep disturbance, and concentration issues.  Loss of bone density was discussed as was possible risk of decreased longevity.  The standard of care for BSO at the time of surgery for endometrial cancer was discussed.  Patient would be a candidate for HRT should she be symptomatic in the setting of an early stage endometrial cancer.     Pain assessment was performed today as a part of patient’s care.  For patients with pain related to surgery, gynecologic malignancy or cancer treatment, the plan is as noted in the assessment/plan.  For patients with pain not related to these issues, they are to seek  any further needed care from a more appropriate provider, such as PCP.      Orders Placed This Encounter   Procedures   • Ambulatory Referral to Psychotherapy     Referral Priority:   Routine     Referral Type:   Behavorial Health/Psych     Referral Reason:   Specialty Services Required     Referred to Provider:   Georgia Gordon APRN     Requested Specialty:   Psychiatry     Number of Visits Requested:   1       FOLLOW UP: surgery    Patient was seen and examined with Dr. Sanches,  resident, who performed portions of the examination and documentation for this patient's care under my direct supervision.  I agree with the above documentation and plan.    Ruth Mistry MD  01/06/21  21:22 EST

## 2021-01-06 NOTE — H&P (VIEW-ONLY)
Destiny Soto  [id number]  1980      Reason for visit:  Grade 2 endometrial cancer    Consultation:  Patient is being seen at the request of Dr. Jacquelin Hogue and  ISACC Gaitan    History of present illness:  The patient is a 40 y.o. year old female who presents today for treatment and evaluation of the above issues.   She presented to Dr. Hogue in December for menorrhagia and irregular menses. She has a recent 55lb weigh loss through diet and exercise. She had oligomenorrhea prior to her weight loss and reports menses that have become increasingly heavy and frequent. She underwent hysteroscopy with dilation and curettage on 2020 with Dr. Hogue and pathology returned as grade 2 endometrial cancer. She reports light pink spotting since her D&C. She continues to feel weak and take iron supplements. She is somewhat tearful noting her longstanding nfertility issues and that her daughter passed away when she was 16 years old.  This current diagnosis is an additional stressor.  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   For new patients, PFS intake form from today was reviewed and confirmed.    OBGYN History:  She is a , her daughter  in an MVA in . . She does not have a history of abnormal pap smears.      Oncologic History:  Oncology/Hematology History    No history exists.         Past Medical History:   Diagnosis Date   • Endometrial cancer (CMS/HCC)    • Female infertility    • Migraine    • Spherocytosis (CMS/HCC)    • Urinary tract infection        Past Surgical History:   Procedure Laterality Date   • D&C HYSTEROSCOPY N/A 2020    Procedure: DILATATION AND CURETTAGE  DIAGNOSTIC HYSTEROSCOPY;  Surgeon: Jacquelin Hogue MD;  Location: [facility];  Service: Obstetrics/Gynecology;  Laterality: N/A;   • DILATATION AND CURETTAGE     • GALLBLADDER SURGERY     • SPLENECTOMY         MEDICATIONS: The current medication  "list was reviewed with the patient and updated in the EMR this date per the Medical Assistant. Medication dosages and frequencies were confirmed to be accurate.      Allergies:  has No Known Allergies.    Social History:   Social History     Socioeconomic History   • Marital status: Single     Spouse name: Not on file   • Number of children: Not on file   • Years of education: Not on file   • Highest education level: Not on file   Social Needs   • Financial resource strain: Patient refused   • Food insecurity     Worry: Patient refused     Inability: Patient refused   • Transportation needs     Medical: Not on file     Non-medical: Not on file   Tobacco Use   • Smoking status: Never Smoker   • Smokeless tobacco: Never Used   Substance and Sexual Activity   • Alcohol use: Never     Frequency: Never   • Drug use: Never   • Sexual activity: Yes     Partners: Male     Birth control/protection: None   Lifestyle   • Physical activity     Days per week: Patient refused     Minutes per session: Patient refused   • Stress: Patient refused       Family History:    Family History   Problem Relation Age of Onset   • Cancer Mother    mother with hysterectomy due to \"uterine\" cancer at 20 yo.    Health Maintenance:    Health Maintenance   Topic Date Due   • ANNUAL PHYSICAL  10/18/1981   • TDAP/TD VACCINES (1 - Tdap) 10/18/1997   • HEPATITIS C SCREENING  12/17/2020   • PAP SMEAR  12/17/2020   • INFLUENZA VACCINE  Completed   • Pneumococcal Vaccine 0-64  Aged Out   • MENINGOCOCCAL VACCINE  Aged Out     Review of Systems   Constitutional: Negative for appetite change, chills, fatigue, fever and unexpected weight change.        Pain   HENT: Negative for ear discharge, ear pain, hearing loss, mouth sores, nosebleeds, postnasal drip, sinus pressure, sinus pain, sore throat, tinnitus and trouble swallowing.    Eyes: Negative for pain, itching and visual disturbance.   Respiratory: Negative for cough, shortness of breath and wheezing.  " "  Cardiovascular: Negative for chest pain and palpitations.   Gastrointestinal: Negative for abdominal pain, blood in stool, constipation, diarrhea, nausea and vomiting.   Endocrine: Negative for heat intolerance.   Genitourinary: Positive for vaginal bleeding. Negative for difficulty urinating, flank pain, frequency, hematuria, urgency and vaginal discharge.   Musculoskeletal: Negative for arthralgias, gait problem and myalgias.   Skin: Negative for color change, rash and wound.   Allergic/Immunologic: Negative for immunocompromised state.   Neurological: Positive for headaches. Negative for dizziness, seizures, syncope, weakness and numbness.   Hematological: Negative for adenopathy. Does not bruise/bleed easily.   Psychiatric/Behavioral: Positive for dysphoric mood and sleep disturbance. Negative for agitation and confusion. The patient is nervous/anxious.        Physical Exam    Vitals:    01/06/21 0912   BP: 145/78   Pulse: 82   Resp: 18   Temp: 96.4 °F (35.8 °C)   TempSrc: Temporal   SpO2: 97%   Weight: 130 kg (286 lb)   Height: 162.6 cm (64.02\")   PainSc:   5   PainLoc: Groin       Body mass index is 49.07 kg/m².    Wt Readings from Last 3 Encounters:   01/06/21 130 kg (286 lb)   12/30/20 130 kg (286 lb)   12/17/20 128 kg (281 lb 12.8 oz)         GENERAL: Alert, well-appearing female appearing her stated age who is in no apparent distress. Patient is obese by BMI criteria.  HEENT: Sclera anicteric. Head normocephalic, atraumatic. Mucus membranes moist.   NECK: Trachea midline, supple, without masses.  No thyromegaly.   BREASTS: Deferred  CARDIOVASCULAR: Normal rate, regular rhythm, no murmurs, rubs, or gallops.  No peripheral edema.  RESPIRATORY: Clear to auscultation bilaterally, normal respiratory effort  BACK:  No CVA tenderness, no vertebral tenderness on palpation  GASTROINTESTINAL:  Abdomen is soft, non-tender, non-distended, no rebound or guarding, no masses, or hernias. No HSM.  Well healed " splenectomy incision.  Obese abdomen.   SKIN:  Warm, dry, well-perfused.  All visible areas intact.  No rashes, lesions, ulcers.  PSYCHIATRIC: AO x3, with appropriate affect, normal thought processes.  NEUROLOGIC: No focal deficits.  Moves extremities well.  MUSCULOSKELETAL: Normal gait and station.   EXTREMITIES:   No cyanosis, clubbing, symmetric.  LYMPHATICS:  No cervical or inguinal adenopathy noted.     PELVIC exam:    External genitalia are free from lesion. On speculum examination, the cervix was free from lesion. On bimanual examination no mass was appreciated.  Uterus was normal in size and shape. There is no cervical motion or uterine tenderness. No cervical mass was palpated. Parametria were smooth. Rectovaginal exam was deferred.     ECOG PS 0    PROCEDURES:  none    Diagnostic Data:      No Images in the past 120 days found..    Lab Results   Component Value Date    WBC 13.13 (H) 12/18/2020    HGB 9.0 (L) 12/18/2020    HCT 28.2 (L) 12/18/2020    MCV 82.2 12/18/2020     (H) 12/18/2020    NEUTROABS 9.27 (H) 12/17/2020     No results found for:         Assessment/Plan   This is a 40 y.o. woman with biopsy proven grade 2 endometrial cancer, Clinical stage I (TINOSNOMO).    Encounter Diagnoses   Name Primary?   • Endometrial cancer (CMS/HCC) Yes   • Anxiety    • Class 3 severe obesity due to excess calories without serious comorbidity with body mass index (BMI) of 45.0 to 49.9 in adult (CMS/HCC)    • Menorrhagia with irregular cycle    • Reactive depression         Patient was consented for robotic assisted total laparoscopic hysterectomy bilateral salpingo-oophorectomy, sentinel lymph node dissection, possible completion lymphadenectomy.    Risks and benefits of surgery were discussed.  This included, but was not limited to, infection and bleeding like when the skin is cut; damage to surrounding structures; and incisional complications.  Risk of DVT was addressed for major surgeries.  Standard  of care efforts to minimize these risks were reviewed.  Typical hospital stay and recovery were discussed as well as post-procedure precautions.  Surgical implications of chronic illnesses on recovery and surgical outcome were reviewed.     Pain medication regimen for postoperative care was discussed.  Typical regimen and avoidance of narcotics was discussed.  Patient was educated that other factors, such as existing narcotic use, can impact postoperative pain management.      Risks and benefits of lymph node dissection were further discussed.  This included lymphocyst, hematoma, lymphedema, vascular injury, and nerve injury.    Patient verbalized understanding of the plan including the risks and benefits.  Appropriate perioperative testing including laboratory evaluation, EKG as clinically indicated, chest x-ray as clinically indicated, and preadmission evaluation were all ordered as a part of this patient's care.    Obesity and its relationship to endometrial cancers was discussed.  We also discussed other obesity related cancers.  Ongoing weight loss was encouraged.    Patient was tearful at today's visit.  We discussed her diagnosis and the loss of her daughter.  She was receptive to counseling referral.    Typical symptoms of menopause were discussed.  These included hot flashes, vaginal dryness, change in libido, sleep disturbance, and concentration issues.  Loss of bone density was discussed as was possible risk of decreased longevity.  The standard of care for BSO at the time of surgery for endometrial cancer was discussed.  Patient would be a candidate for HRT should she be symptomatic in the setting of an early stage endometrial cancer.     Pain assessment was performed today as a part of patient’s care.  For patients with pain related to surgery, gynecologic malignancy or cancer treatment, the plan is as noted in the assessment/plan.  For patients with pain not related to these issues, they are to seek  any further needed care from a more appropriate provider, such as PCP.      Orders Placed This Encounter   Procedures   • Ambulatory Referral to Psychotherapy     Referral Priority:   Routine     Referral Type:   Behavorial Health/Psych     Referral Reason:   Specialty Services Required     Referred to Provider:   Georgia Gordon APRN     Requested Specialty:   Psychiatry     Number of Visits Requested:   1       FOLLOW UP: surgery    Patient was seen and examined with Dr. Sanches,  resident, who performed portions of the examination and documentation for this patient's care under my direct supervision.  I agree with the above documentation and plan.    Ruth Mistry MD  01/06/21  21:22 EST

## 2021-01-11 ENCOUNTER — TELEPHONE (OUTPATIENT)
Dept: GYNECOLOGIC ONCOLOGY | Facility: CLINIC | Age: 41
End: 2021-01-11

## 2021-01-11 NOTE — TELEPHONE ENCOUNTER
Spoke with DAVID Koroma regarding pt's symptoms.  It is normal to have vaginal bleeding with endometrial cancer.  Her scheduled surgery with Dr. Mistry is in a week.  If pt starts hemorrhaging or bleeding through a pad in an hour to go to the ER.  Informed pt of above, she verbalized understanding.

## 2021-01-11 NOTE — TELEPHONE ENCOUNTER
Pt called c/o vaginal bleeding that started this morning.  Pt states that blood is bright red and heavy.  Pain score this morning was a 5 until she took some Ibuprofen and pain level is more bearable at a 2.  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  She had a D&C recently on 12/21/2020.  When asked, the pt states that she is close to when her period should start.

## 2021-01-15 ENCOUNTER — HOSPITAL ENCOUNTER (OUTPATIENT)
Dept: GENERAL RADIOLOGY | Facility: HOSPITAL | Age: 41
Discharge: HOME | End: 2021-01-15

## 2021-01-15 ENCOUNTER — APPOINTMENT (OUTPATIENT)
Dept: PREADMISSION TESTING | Facility: HOSPITAL | Age: 41
End: 2021-01-15

## 2021-01-15 VITALS — BODY MASS INDEX: 51.02 KG/M2 | HEIGHT: 63 IN | WEIGHT: 287.92 LBS

## 2021-01-15 DIAGNOSIS — C54.1 ENDOMETRIAL CANCER (HCC): ICD-10-CM

## 2021-01-15 LAB
ABO GROUP BLD: NORMAL
ALBUMIN SERPL-MCNC: 4.1 G/DL (ref 3.5–5.2)
ALBUMIN/GLOB SERPL: 1 G/DL
ALP SERPL-CCNC: 152 U/L (ref 39–117)
ALT SERPL W P-5'-P-CCNC: 12 U/L (ref 1–33)
ANION GAP SERPL CALCULATED.3IONS-SCNC: 7 MMOL/L (ref 5–15)
AST SERPL-CCNC: 20 U/L (ref 1–32)
BASOPHILS # BLD AUTO: 0.11 10*3/MM3 (ref 0–0.2)
BASOPHILS NFR BLD AUTO: 0.7 % (ref 0–1.5)
BILIRUB SERPL-MCNC: <0.2 MG/DL (ref 0–1.2)
BLD GP AB SCN SERPL QL: NEGATIVE
BUN SERPL-MCNC: 12 MG/DL (ref 6–20)
BUN/CREAT SERPL: 19.7 (ref 7–25)
CALCIUM SPEC-SCNC: 8.5 MG/DL (ref 8.6–10.5)
CHLORIDE SERPL-SCNC: 102 MMOL/L (ref 98–107)
CO2 SERPL-SCNC: 29 MMOL/L (ref 22–29)
CREAT SERPL-MCNC: 0.61 MG/DL (ref 0.57–1)
DEPRECATED RDW RBC AUTO: 45.1 FL (ref 37–54)
EOSINOPHIL # BLD AUTO: 0.67 10*3/MM3 (ref 0–0.4)
EOSINOPHIL NFR BLD AUTO: 4.4 % (ref 0.3–6.2)
ERYTHROCYTE [DISTWIDTH] IN BLOOD BY AUTOMATED COUNT: 15.2 % (ref 12.3–15.4)
GFR SERPL CREATININE-BSD FRML MDRD: 108 ML/MIN/1.73
GLOBULIN UR ELPH-MCNC: 4.3 GM/DL
GLUCOSE SERPL-MCNC: 107 MG/DL (ref 65–99)
HBA1C MFR BLD: 5.3 % (ref 4.8–5.6)
HCG INTACT+B SERPL-ACNC: <0.5 MIU/ML
HCT VFR BLD AUTO: 32.8 % (ref 34–46.6)
HGB BLD-MCNC: 9.6 G/DL (ref 12–15.9)
IMM GRANULOCYTES # BLD AUTO: 0.12 10*3/MM3 (ref 0–0.05)
IMM GRANULOCYTES NFR BLD AUTO: 0.8 % (ref 0–0.5)
LYMPHOCYTES # BLD AUTO: 4.03 10*3/MM3 (ref 0.7–3.1)
LYMPHOCYTES NFR BLD AUTO: 26.3 % (ref 19.6–45.3)
MCH RBC QN AUTO: 23.8 PG (ref 26.6–33)
MCHC RBC AUTO-ENTMCNC: 29.3 G/DL (ref 31.5–35.7)
MCV RBC AUTO: 81.4 FL (ref 79–97)
MONOCYTES # BLD AUTO: 0.9 10*3/MM3 (ref 0.1–0.9)
MONOCYTES NFR BLD AUTO: 5.9 % (ref 5–12)
NEUTROPHILS NFR BLD AUTO: 61.9 % (ref 42.7–76)
NEUTROPHILS NFR BLD AUTO: 9.48 10*3/MM3 (ref 1.7–7)
NRBC BLD AUTO-RTO: 0.7 /100 WBC (ref 0–0.2)
PLATELET # BLD AUTO: 974 10*3/MM3 (ref 140–450)
PMV BLD AUTO: 9.3 FL (ref 6–12)
POTASSIUM SERPL-SCNC: 3.6 MMOL/L (ref 3.5–5.2)
PROT SERPL-MCNC: 8.4 G/DL (ref 6–8.5)
QT INTERVAL: 366 MS
QTC INTERVAL: 437 MS
RBC # BLD AUTO: 4.03 10*6/MM3 (ref 3.77–5.28)
RH BLD: POSITIVE
SODIUM SERPL-SCNC: 138 MMOL/L (ref 136–145)
T&S EXPIRATION DATE: NORMAL
WBC # BLD AUTO: 15.31 10*3/MM3 (ref 3.4–10.8)

## 2021-01-15 PROCEDURE — 86901 BLOOD TYPING SEROLOGIC RH(D): CPT

## 2021-01-15 PROCEDURE — 80053 COMPREHEN METABOLIC PANEL: CPT

## 2021-01-15 PROCEDURE — U0004 COV-19 TEST NON-CDC HGH THRU: HCPCS

## 2021-01-15 PROCEDURE — 86850 RBC ANTIBODY SCREEN: CPT

## 2021-01-15 PROCEDURE — 86900 BLOOD TYPING SEROLOGIC ABO: CPT

## 2021-01-15 PROCEDURE — 36415 COLL VENOUS BLD VENIPUNCTURE: CPT

## 2021-01-15 PROCEDURE — 84702 CHORIONIC GONADOTROPIN TEST: CPT

## 2021-01-15 PROCEDURE — 71046 X-RAY EXAM CHEST 2 VIEWS: CPT

## 2021-01-15 PROCEDURE — 93005 ELECTROCARDIOGRAM TRACING: CPT

## 2021-01-15 PROCEDURE — 85025 COMPLETE CBC W/AUTO DIFF WBC: CPT

## 2021-01-15 PROCEDURE — 93010 ELECTROCARDIOGRAM REPORT: CPT | Performed by: INTERNAL MEDICINE

## 2021-01-15 PROCEDURE — 83036 HEMOGLOBIN GLYCOSYLATED A1C: CPT

## 2021-01-15 PROCEDURE — C9803 HOPD COVID-19 SPEC COLLECT: HCPCS

## 2021-01-15 NOTE — PAT
Patient to apply Chlorhexadine wipes  to surgical area (as instructed) the night before procedure and the AM of procedure. Wipes provided.    Blood bank bracelet applied to patient's left wrist during Pre Admission Testing visit.  Patient instructed not to remove from arm until after procedure and they are discharged from the hospital.  Explained to patient that they may shower and get the bracelet wet, but not to immerse under water for longer periods (bathing, swimming, hand dishwashing, etc).  Patient verbalized understanding.    Sent pt. For CXR.    Instructed pt. To stop ibuprofen until after surgery.

## 2021-01-17 ENCOUNTER — ANESTHESIA EVENT (OUTPATIENT)
Dept: PERIOP | Facility: HOSPITAL | Age: 41
End: 2021-01-17

## 2021-01-17 LAB — SARS-COV-2 RNA RESP QL NAA+PROBE: NOT DETECTED

## 2021-01-17 RX ORDER — FAMOTIDINE 10 MG/ML
20 INJECTION, SOLUTION INTRAVENOUS ONCE
Status: CANCELLED | OUTPATIENT
Start: 2021-01-17 | End: 2021-01-17

## 2021-01-17 RX ORDER — SODIUM CHLORIDE 0.9 % (FLUSH) 0.9 %
10 SYRINGE (ML) INJECTION AS NEEDED
Status: CANCELLED | OUTPATIENT
Start: 2021-01-17

## 2021-01-17 RX ORDER — SODIUM CHLORIDE 0.9 % (FLUSH) 0.9 %
10 SYRINGE (ML) INJECTION EVERY 12 HOURS SCHEDULED
Status: CANCELLED | OUTPATIENT
Start: 2021-01-17

## 2021-01-18 ENCOUNTER — HOSPITAL ENCOUNTER (OUTPATIENT)
Facility: HOSPITAL | Age: 41
Discharge: HOME | End: 2021-01-18
Attending: OBSTETRICS & GYNECOLOGY | Admitting: OBSTETRICS & GYNECOLOGY

## 2021-01-18 ENCOUNTER — ANESTHESIA (OUTPATIENT)
Dept: PERIOP | Facility: HOSPITAL | Age: 41
End: 2021-01-18

## 2021-01-18 VITALS
OXYGEN SATURATION: 99 % | DIASTOLIC BLOOD PRESSURE: 69 MMHG | RESPIRATION RATE: 17 BRPM | SYSTOLIC BLOOD PRESSURE: 138 MMHG | HEART RATE: 53 BPM | TEMPERATURE: 98 F

## 2021-01-18 DIAGNOSIS — C54.1 ENDOMETRIAL CANCER (HCC): Primary | ICD-10-CM

## 2021-01-18 LAB
ABO GROUP BLD: NORMAL
B-HCG UR QL: NEGATIVE
INTERNAL NEGATIVE CONTROL: NEGATIVE
INTERNAL POSITIVE CONTROL: POSITIVE
Lab: NORMAL
RH BLD: POSITIVE

## 2021-01-18 PROCEDURE — 38900 IO MAP OF SENT LYMPH NODE: CPT | Performed by: PHYSICIAN ASSISTANT

## 2021-01-18 PROCEDURE — 38572 LAPAROSCOPY LYMPHADENECTOMY: CPT | Performed by: PHYSICIAN ASSISTANT

## 2021-01-18 PROCEDURE — G0378 HOSPITAL OBSERVATION PER HR: HCPCS

## 2021-01-18 PROCEDURE — 63710000001 ONDANSETRON PER 8 MG: Performed by: STUDENT IN AN ORGANIZED HEALTH CARE EDUCATION/TRAINING PROGRAM

## 2021-01-18 PROCEDURE — 88342 IMHCHEM/IMCYTCHM 1ST ANTB: CPT | Performed by: OBSTETRICS & GYNECOLOGY

## 2021-01-18 PROCEDURE — 25010000002 FENTANYL CITRATE (PF) 50 MCG/ML SOLUTION: Performed by: NURSE ANESTHETIST, CERTIFIED REGISTERED

## 2021-01-18 PROCEDURE — 25010000002 NEOSTIGMINE 10 MG/10ML SOLUTION: Performed by: NURSE ANESTHETIST, CERTIFIED REGISTERED

## 2021-01-18 PROCEDURE — 25010000002 FENTANYL CITRATE (PF) 100 MCG/2ML SOLUTION: Performed by: NURSE ANESTHETIST, CERTIFIED REGISTERED

## 2021-01-18 PROCEDURE — 88309 TISSUE EXAM BY PATHOLOGIST: CPT | Performed by: OBSTETRICS & GYNECOLOGY

## 2021-01-18 PROCEDURE — 25010000002 ONDANSETRON PER 1 MG: Performed by: NURSE ANESTHETIST, CERTIFIED REGISTERED

## 2021-01-18 PROCEDURE — 58571 TLH W/T/O 250 G OR LESS: CPT | Performed by: OBSTETRICS & GYNECOLOGY

## 2021-01-18 PROCEDURE — 25010000002 PROPOFOL 10 MG/ML EMULSION: Performed by: NURSE ANESTHETIST, CERTIFIED REGISTERED

## 2021-01-18 PROCEDURE — 88307 TISSUE EXAM BY PATHOLOGIST: CPT | Performed by: OBSTETRICS & GYNECOLOGY

## 2021-01-18 PROCEDURE — 25010000002 HYDROMORPHONE PER 4 MG: Performed by: NURSE ANESTHETIST, CERTIFIED REGISTERED

## 2021-01-18 PROCEDURE — 88341 IMHCHEM/IMCYTCHM EA ADD ANTB: CPT | Performed by: OBSTETRICS & GYNECOLOGY

## 2021-01-18 PROCEDURE — 86900 BLOOD TYPING SEROLOGIC ABO: CPT

## 2021-01-18 PROCEDURE — 86901 BLOOD TYPING SEROLOGIC RH(D): CPT

## 2021-01-18 PROCEDURE — 38900 IO MAP OF SENT LYMPH NODE: CPT | Performed by: OBSTETRICS & GYNECOLOGY

## 2021-01-18 PROCEDURE — 25010000002 DEXAMETHASONE SODIUM PHOSPHATE 10 MG/ML SOLUTION: Performed by: NURSE ANESTHETIST, CERTIFIED REGISTERED

## 2021-01-18 PROCEDURE — 38572 LAPAROSCOPY LYMPHADENECTOMY: CPT | Performed by: OBSTETRICS & GYNECOLOGY

## 2021-01-18 PROCEDURE — 58571 TLH W/T/O 250 G OR LESS: CPT | Performed by: PHYSICIAN ASSISTANT

## 2021-01-18 PROCEDURE — 81025 URINE PREGNANCY TEST: CPT | Performed by: ANESTHESIOLOGY

## 2021-01-18 RX ORDER — DOCUSATE SODIUM 100 MG/1
100 CAPSULE, LIQUID FILLED ORAL 2 TIMES DAILY
Status: DISCONTINUED | OUTPATIENT
Start: 2021-01-18 | End: 2021-01-18 | Stop reason: HOSPADM

## 2021-01-18 RX ORDER — DEXAMETHASONE SODIUM PHOSPHATE 10 MG/ML
INJECTION, SOLUTION INTRAMUSCULAR; INTRAVENOUS AS NEEDED
Status: DISCONTINUED | OUTPATIENT
Start: 2021-01-18 | End: 2021-01-18 | Stop reason: SURG

## 2021-01-18 RX ORDER — VECURONIUM BROMIDE 1 MG/ML
INJECTION, POWDER, LYOPHILIZED, FOR SOLUTION INTRAVENOUS AS NEEDED
Status: DISCONTINUED | OUTPATIENT
Start: 2021-01-18 | End: 2021-01-18 | Stop reason: SURG

## 2021-01-18 RX ORDER — CELECOXIB 200 MG/1
200 CAPSULE ORAL ONCE
Status: COMPLETED | OUTPATIENT
Start: 2021-01-18 | End: 2021-01-18

## 2021-01-18 RX ORDER — FENTANYL CITRATE 50 UG/ML
INJECTION, SOLUTION INTRAMUSCULAR; INTRAVENOUS AS NEEDED
Status: DISCONTINUED | OUTPATIENT
Start: 2021-01-18 | End: 2021-01-18 | Stop reason: SURG

## 2021-01-18 RX ORDER — ACETAMINOPHEN 325 MG/1
650 TABLET ORAL EVERY 6 HOURS PRN
Status: DISCONTINUED | OUTPATIENT
Start: 2021-01-18 | End: 2021-01-18 | Stop reason: HOSPADM

## 2021-01-18 RX ORDER — PSEUDOEPHEDRINE HCL 30 MG
100 TABLET ORAL 2 TIMES DAILY
Qty: 60 CAPSULE | Refills: 3 | Status: SHIPPED | OUTPATIENT
Start: 2021-01-18 | End: 2021-05-03

## 2021-01-18 RX ORDER — ONDANSETRON 2 MG/ML
4 INJECTION INTRAMUSCULAR; INTRAVENOUS ONCE AS NEEDED
Status: DISCONTINUED | OUTPATIENT
Start: 2021-01-18 | End: 2021-01-18 | Stop reason: HOSPADM

## 2021-01-18 RX ORDER — OXYCODONE HYDROCHLORIDE 5 MG/1
5 TABLET ORAL EVERY 4 HOURS PRN
Status: DISCONTINUED | OUTPATIENT
Start: 2021-01-18 | End: 2021-01-18 | Stop reason: HOSPADM

## 2021-01-18 RX ORDER — PROPOFOL 10 MG/ML
VIAL (ML) INTRAVENOUS AS NEEDED
Status: DISCONTINUED | OUTPATIENT
Start: 2021-01-18 | End: 2021-01-18 | Stop reason: SURG

## 2021-01-18 RX ORDER — SODIUM CHLORIDE, SODIUM LACTATE, POTASSIUM CHLORIDE, CALCIUM CHLORIDE 600; 310; 30; 20 MG/100ML; MG/100ML; MG/100ML; MG/100ML
9 INJECTION, SOLUTION INTRAVENOUS CONTINUOUS
Status: DISCONTINUED | OUTPATIENT
Start: 2021-01-18 | End: 2021-01-18 | Stop reason: HOSPADM

## 2021-01-18 RX ORDER — GLYCOPYRROLATE 0.2 MG/ML
INJECTION INTRAMUSCULAR; INTRAVENOUS AS NEEDED
Status: DISCONTINUED | OUTPATIENT
Start: 2021-01-18 | End: 2021-01-18 | Stop reason: SURG

## 2021-01-18 RX ORDER — ACETAMINOPHEN 325 MG/1
650 TABLET ORAL ONCE
Status: COMPLETED | OUTPATIENT
Start: 2021-01-18 | End: 2021-01-18

## 2021-01-18 RX ORDER — FENTANYL CITRATE 50 UG/ML
50 INJECTION, SOLUTION INTRAMUSCULAR; INTRAVENOUS
Status: DISCONTINUED | OUTPATIENT
Start: 2021-01-18 | End: 2021-01-18 | Stop reason: HOSPADM

## 2021-01-18 RX ORDER — MAGNESIUM HYDROXIDE 1200 MG/15ML
LIQUID ORAL AS NEEDED
Status: DISCONTINUED | OUTPATIENT
Start: 2021-01-18 | End: 2021-01-18 | Stop reason: HOSPADM

## 2021-01-18 RX ORDER — LIDOCAINE HYDROCHLORIDE 10 MG/ML
INJECTION, SOLUTION EPIDURAL; INFILTRATION; INTRACAUDAL; PERINEURAL AS NEEDED
Status: DISCONTINUED | OUTPATIENT
Start: 2021-01-18 | End: 2021-01-18 | Stop reason: SURG

## 2021-01-18 RX ORDER — HYDROMORPHONE HYDROCHLORIDE 1 MG/ML
0.5 INJECTION, SOLUTION INTRAMUSCULAR; INTRAVENOUS; SUBCUTANEOUS
Status: DISCONTINUED | OUTPATIENT
Start: 2021-01-18 | End: 2021-01-18 | Stop reason: HOSPADM

## 2021-01-18 RX ORDER — FAMOTIDINE 20 MG/1
20 TABLET, FILM COATED ORAL ONCE
Status: COMPLETED | OUTPATIENT
Start: 2021-01-18 | End: 2021-01-18

## 2021-01-18 RX ORDER — ONDANSETRON 4 MG/1
4 TABLET, FILM COATED ORAL EVERY 6 HOURS PRN
Status: DISCONTINUED | OUTPATIENT
Start: 2021-01-18 | End: 2021-01-18 | Stop reason: HOSPADM

## 2021-01-18 RX ORDER — SODIUM CHLORIDE 9 MG/ML
INJECTION, SOLUTION INTRAVENOUS AS NEEDED
Status: DISCONTINUED | OUTPATIENT
Start: 2021-01-18 | End: 2021-01-18 | Stop reason: HOSPADM

## 2021-01-18 RX ORDER — CEFAZOLIN SODIUM IN 0.9 % NACL 3 G/100 ML
3 INTRAVENOUS SOLUTION, PIGGYBACK (ML) INTRAVENOUS ONCE
Status: COMPLETED | OUTPATIENT
Start: 2021-01-18 | End: 2021-01-18

## 2021-01-18 RX ORDER — LIDOCAINE HYDROCHLORIDE 10 MG/ML
0.5 INJECTION, SOLUTION EPIDURAL; INFILTRATION; INTRACAUDAL; PERINEURAL ONCE AS NEEDED
Status: COMPLETED | OUTPATIENT
Start: 2021-01-18 | End: 2021-01-18

## 2021-01-18 RX ORDER — OXYCODONE HYDROCHLORIDE 5 MG/1
5 TABLET ORAL EVERY 4 HOURS PRN
Qty: 10 TABLET | Refills: 0 | Status: SHIPPED | OUTPATIENT
Start: 2021-01-18 | End: 2021-01-25

## 2021-01-18 RX ORDER — NEOSTIGMINE METHYLSULFATE 1 MG/ML
INJECTION, SOLUTION INTRAVENOUS AS NEEDED
Status: DISCONTINUED | OUTPATIENT
Start: 2021-01-18 | End: 2021-01-18 | Stop reason: SURG

## 2021-01-18 RX ORDER — ONDANSETRON 2 MG/ML
INJECTION INTRAMUSCULAR; INTRAVENOUS AS NEEDED
Status: DISCONTINUED | OUTPATIENT
Start: 2021-01-18 | End: 2021-01-18 | Stop reason: SURG

## 2021-01-18 RX ORDER — ONDANSETRON 4 MG/1
4 TABLET, FILM COATED ORAL EVERY 6 HOURS PRN
Qty: 10 TABLET | Refills: 3 | Status: SHIPPED | OUTPATIENT
Start: 2021-01-18

## 2021-01-18 RX ORDER — IBUPROFEN 600 MG/1
600 TABLET ORAL EVERY 6 HOURS PRN
Qty: 30 TABLET | Refills: 0 | Status: SHIPPED | OUTPATIENT
Start: 2021-01-18

## 2021-01-18 RX ORDER — ACETAMINOPHEN 325 MG/1
650 TABLET ORAL EVERY 6 HOURS PRN
Qty: 60 TABLET | Refills: 0 | Status: SHIPPED | OUTPATIENT
Start: 2021-01-18 | End: 2021-05-03

## 2021-01-18 RX ORDER — IBUPROFEN 600 MG/1
600 TABLET ORAL EVERY 6 HOURS PRN
Status: DISCONTINUED | OUTPATIENT
Start: 2021-01-18 | End: 2021-01-18 | Stop reason: HOSPADM

## 2021-01-18 RX ADMIN — PROPOFOL 25 MCG/KG/MIN: 10 INJECTION, EMULSION INTRAVENOUS at 09:58

## 2021-01-18 RX ADMIN — FENTANYL CITRATE 50 MCG: 50 INJECTION, SOLUTION INTRAMUSCULAR; INTRAVENOUS at 12:49

## 2021-01-18 RX ADMIN — FENTANYL CITRATE 50 MCG: 50 INJECTION, SOLUTION INTRAMUSCULAR; INTRAVENOUS at 09:57

## 2021-01-18 RX ADMIN — PROPOFOL 200 MG: 10 INJECTION, EMULSION INTRAVENOUS at 09:58

## 2021-01-18 RX ADMIN — ONDANSETRON 4 MG: 2 INJECTION INTRAMUSCULAR; INTRAVENOUS at 12:00

## 2021-01-18 RX ADMIN — DEXAMETHASONE SODIUM PHOSPHATE 8 MG: 10 INJECTION, SOLUTION INTRAMUSCULAR; INTRAVENOUS at 09:58

## 2021-01-18 RX ADMIN — IBUPROFEN 600 MG: 600 TABLET ORAL at 15:34

## 2021-01-18 RX ADMIN — GLYCOPYRROLATE 0.6 MG: 0.4 INJECTION INTRAMUSCULAR; INTRAVENOUS at 12:49

## 2021-01-18 RX ADMIN — LIDOCAINE HYDROCHLORIDE 50 MG: 10 INJECTION, SOLUTION EPIDURAL; INFILTRATION; INTRACAUDAL; PERINEURAL at 09:58

## 2021-01-18 RX ADMIN — ACETAMINOPHEN 650 MG: 325 TABLET ORAL at 09:30

## 2021-01-18 RX ADMIN — FENTANYL CITRATE 50 MCG: 50 INJECTION, SOLUTION INTRAMUSCULAR; INTRAVENOUS at 13:52

## 2021-01-18 RX ADMIN — HYDROMORPHONE HYDROCHLORIDE 0.5 MG: 1 INJECTION, SOLUTION INTRAMUSCULAR; INTRAVENOUS; SUBCUTANEOUS at 13:37

## 2021-01-18 RX ADMIN — CELECOXIB 200 MG: 200 CAPSULE ORAL at 09:30

## 2021-01-18 RX ADMIN — GLYCOPYRROLATE 0.2 MG: 0.4 INJECTION INTRAMUSCULAR; INTRAVENOUS at 10:36

## 2021-01-18 RX ADMIN — VECURONIUM BROMIDE 10 MG: 1 INJECTION, POWDER, LYOPHILIZED, FOR SOLUTION INTRAVENOUS at 09:58

## 2021-01-18 RX ADMIN — SODIUM CHLORIDE, POTASSIUM CHLORIDE, SODIUM LACTATE AND CALCIUM CHLORIDE 9 ML/HR: 600; 310; 30; 20 INJECTION, SOLUTION INTRAVENOUS at 09:31

## 2021-01-18 RX ADMIN — NEOSTIGMINE 4 MG: 1 INJECTION INTRAVENOUS at 12:49

## 2021-01-18 RX ADMIN — FENTANYL CITRATE 50 MCG: 50 INJECTION, SOLUTION INTRAMUSCULAR; INTRAVENOUS at 13:28

## 2021-01-18 RX ADMIN — ONDANSETRON HYDROCHLORIDE 4 MG: 4 TABLET, FILM COATED ORAL at 14:53

## 2021-01-18 RX ADMIN — FAMOTIDINE 20 MG: 20 TABLET, FILM COATED ORAL at 09:30

## 2021-01-18 RX ADMIN — Medication 3 G: at 09:57

## 2021-01-18 RX ADMIN — GLYCOPYRROLATE 0.2 MG: 0.4 INJECTION INTRAMUSCULAR; INTRAVENOUS at 10:50

## 2021-01-18 RX ADMIN — LIDOCAINE HYDROCHLORIDE 0.5 ML: 10 INJECTION, SOLUTION EPIDURAL; INFILTRATION; INTRACAUDAL; PERINEURAL at 09:31

## 2021-01-18 RX ADMIN — HYDROMORPHONE HYDROCHLORIDE 0.5 MG: 1 INJECTION, SOLUTION INTRAMUSCULAR; INTRAVENOUS; SUBCUTANEOUS at 14:16

## 2021-01-18 NOTE — ANESTHESIA PROCEDURE NOTES
Airway  Urgency: elective    Date/Time: 1/18/2021 10:00 AM  Airway not difficult    General Information and Staff    Patient location during procedure: OR  CRNA: Tj Medrano CRNA    Indications and Patient Condition  Indications for airway management: airway protection    Preoxygenated: yes  MILS not maintained throughout  Mask difficulty assessment: 2 - vent by mask + OA or adjuvant +/- NMBA    Final Airway Details  Final airway type: endotracheal airway      Successful airway: ETT  Cuffed: yes   Successful intubation technique: direct laryngoscopy  Endotracheal tube insertion site: oral  Blade: Monae  Blade size: 3  ETT size (mm): 8.0  Cormack-Lehane Classification: grade I - full view of glottis  Placement verified by: chest auscultation and capnometry   Measured from: lips  ETT/EBT  to lips (cm): 20  Number of attempts at approach: 1  Assessment: lips, teeth, and gum same as pre-op and atraumatic intubation    Additional Comments  Negative epigastric sounds, Breath sound equal bilaterally with symmetric chest rise and fall

## 2021-01-18 NOTE — ANESTHESIA POSTPROCEDURE EVALUATION
Patient: Destiny Soto    Procedure Summary     Date: 01/18/21 Room / Location:  [facility] /  [facility]    Anesthesia Start: 0957 Anesthesia Stop: 1307    Procedure: TYPE ONE RADICAL HYSTERECTOMY BILATERAL SALPINGOOPHORECTOMY WITH DAVINCI ROBOT,BILATERAL PELVIC  SENTINEL LYMPH NODE DISSECTION (N/A Abdomen) Diagnosis:       Endometrial cancer (CMS/HCC)      (Endometrial cancer (CMS/HCC) [C54.1])    Surgeon: Ruth Mistry MD Provider: Fabiola Nolasco MD    Anesthesia Type: general ASA Status: 3          Anesthesia Type: general    Vitals  Vitals Value Taken Time   /60 01/18/21 1304   Temp     Pulse 64 01/18/21 1306   Resp     SpO2 95 % 01/18/21 1307   Vitals shown include unvalidated device data.        Post Anesthesia Care and Evaluation    Patient location during evaluation: PACU  Patient participation: waiting for patient participation  Level of consciousness: responsive to physical stimuli  Pain management: adequate  Airway patency: patent  Anesthetic complications: No anesthetic complications  PONV Status: none  Cardiovascular status: hemodynamically stable and acceptable  Respiratory status: nonlabored ventilation, acceptable and nasal cannula  Hydration status: acceptable

## 2021-01-18 NOTE — OP NOTE
Subjective     Date of Service:  01/18/21  Time of Service:  13:04 EST    Surgical Staff: Surgeon(s) and Role:     * Ruth Mistry MD - Primary     * Prisca Sanches MD - Resident - Assisting   Additional Staff:   Assistant: Noel Mercado PA; Tio Wan PA-C  was responsible for performing the following activities: Irrigation, suctioning, retracting, suturing and their skilled assistance was necessary for the success of this case.     Pre-operative diagnosis(es): Pre-Op Diagnosis Codes:     * Endometrial cancer (CMS/HCC) [C54.1]     Post-operative diagnosis(es): Post-Op Diagnosis Codes:     * Endometrial cancer (CMS/HCC) [C54.1]   Procedure(s): Procedure(s):  TYPE ONE RADICAL HYSTERECTOMY BILATERAL SALPINGOOPHORECTOMY WITH DAVINCI ROBOT,BILATERAL PELVIC  SENTINEL LYMPH NODE DISSECTION     Antibiotics: cefazolin (Ancef) ordered on call to OR     Anesthesia: Type: General  ASA:  III     Objective      Operative findings:  The time of laparoscopy, sentinel lymph nodes mapped to the bilateral pelvis.  On the left, this was the obturator space and on the right this was the lymph nodes overlying the external iliac vein.     Specimens removed: ID Type Source Tests Collected by Time   A (Not marked as sent) : LEFT PELVIC SENTINEL LYMPH NODE Tissue [place] Lymph Node TISSUE PATHOLOGY EXAM Ruth Mistry MD 1/18/2021 1106   B (Not marked as sent) : RIGHT PELVIC LYMP NODES Tissue Lymph Node TISSUE PATHOLOGY EXAM Ruth Mistry MD 1/18/2021 1209   C (Not marked as sent) :  Tissue Uterus with Cervix, Bilateral Tubes and Ovaries TISSUE PATHOLOGY EXAM Ruth Mistry MD 1/18/2021 1209      Fluid Intake and Output: I/O this shift:  In: 200 [I.V.:200]  Out: 150 [Urine:150]   Blood products used: No   Drains: [REMOVED] Urethral Catheter Silicone 16 Fr. (Removed)      Implant Information: Nothing was implanted during the procedure   Complications:  No immediate   Intraoperative consult(s):    Condition:  stable   Disposition: to PACU and then possible discharge home       Indications:  Patient is a pleasant 40-year-old woman who was noted to have a grade 2 endometrioid adenocarcinoma on dilation and curettage.  Risks and benefits of surgery were discussed.  Consent was signed and on chart.    Procedure:   After obtaining informed consent, the patient was taken to the operating room and underwent general endotracheal anesthesia after patient and site verification. The feet were placed in Gigi stirrups. The arms were tucked at the sides. Steep Trendelenburg positioning was tested and found to be adequate. The abdomen, perineum, and vagina were prepped and draped in the usual sterile fashion. Cochran catheter was anchored. Retractors were used to visualize the cervix.  Cervix was injected with fluorescein dye at 3 and 9:00 in the usual fashion.  Cervix was sounded and the appropriate uterine manipulator was called for.  Uterine manipulator was secured with out difficulty.  Attention was turned to the abdomen. Prior to each incision, skin was injected with 0.5% Marcaine with epinephrine.  An 8 mm trocar was inserted at the umbilical midline position in the Optiview technique.  Laparoscope was introduced to confirm positioning. Steep Trendelenburg was called for. The abdomen was insufflated to a pressure of 15 mmHg with CO2 gas.  2 left-sided 8 mm and 2 right-sided 8 mm trochars were all placed under direct visualization.  The above findings were noted.  Da Johnnie robot was docked to the patient and surgeon retired to the operating console.    The peritoneum overlying the pelvic sidewalls was divided with monopolar scissors. Infundibulopelvic ligaments were isolated from surrounding structures and pedicles were created using bipolar cautery and scissors. Likewise, the round ligaments were divided. Anterior and posterior leaves of the broad ligament were divided with monopolar scissors. A bladder flap was developed.   Uterine vessels were isolated. Pedicles were created at the level of the uterine vessels using bipolar cautery and scissors. Cardinal ligament and uterosacral ligament complexes were divided in a similar fashion. Monopolar scissors were used to perform a circumferential colpotomy.  The specimen was too large to be removed intact and was placed in an Endo Catch bag and removed in a piecemeal fashion.  Large volume tumor was identified.  There was no tumor spillage.    Bilateral pelvic sentinel lymph node dissection was performed according to the usual anatomic landmarks including the aorta and its bifurcation, the common iliac arteries and their bifurcations, the external and internal iliac arteries, the obturator and genitofemoral nerves, and the umbilical ligament. Pelvic lymph nodes were placed in Endo Catch bags and removed from the field.    The vaginal cuff was closed with 0 Vicryl suture in a running locking fashion x2 layers. Good hemostasis was noted. Additional hemostasis was achieved at the pelvis as needed using bipolar cautery. Da Johnnie robot was undocked from the patient and the surgeon returned to the bedside.  Trochars were removed under direct visualization.  CO2 gas was allowed to escape from the abdominal cavity.  Fascia at the umbilicus was palpated and a small defect was noted.  This was reapproximated using 0 Vicryl in a figure-of-eight fashion.  At that point, no fascial defects could be palpated. The skin was closed with 3-0 Monocryl in subcuticular stitch and glue was placed at the skin. The vagina was inspected.  Occluder and all instruments had been removed from the vagina.  Good hemostasis was noted at the vagina.  Bladder was back filled with 120 mL of sterile solution and the muro was discontinued.  The patient was taken to the recovery room in good condition. There were no immediate complications. All counts were correct.          Ruth Mistry MD  01/18/21  13:04 EST

## 2021-01-18 NOTE — INTERVAL H&P NOTE
[facility] Pre-op    Full history and physical note from office is attached.    VS: /88  HR 87  RR 16  T 98.4  Sat 97%RA    Immunizations:  Influenza:  2020  Pneumococcal:  UTD  Tetanus:  UTD    LAB Results:  Lab Results   Component Value Date    WBC 15.31 (H) 01/15/2021    HGB 9.6 (L) 01/15/2021    HCT 32.8 (L) 01/15/2021    MCV 81.4 01/15/2021     (H) 01/15/2021    NEUTROABS 9.48 (H) 01/15/2021    GLUCOSE 107 (H) 01/15/2021    BUN 12 01/15/2021    CREATININE 0.61 01/15/2021    EGFRIFNONA 108 01/15/2021     01/15/2021    K 3.6 01/15/2021     01/15/2021    CO2 29.0 01/15/2021    CALCIUM 8.5 (L) 01/15/2021    ALBUMIN 4.10 01/15/2021    AST 20 01/15/2021    ALT 12 01/15/2021    BILITOT <0.2 01/15/2021       Cancer Staging (if applicable)  Cancer Patient: _x_ yes __no __unknown__N/A; If yes, clinical stage T:_1nos_ N:0__M:_0_, stage group I      Impression: Endometrial cancer      Plan: TOTAL LAPAROSCOPIC HYSTERECTOMY BILATERAL SALPINGOOPHORECTOMY WITH DAVINCI ROBOT, SENTINEL LYMPH NODE DISSECTION ( POSSIBLE COMPLETION LYMPHADENECTOMY )      DAVID Macdonald   1/18/2021   09:35 EST     I saw and evaluated the patient. I agree with the findings and the plan of care as documented in the note.    Ruth Mistry MD  01/18/21  09:53 EST

## 2021-01-24 ENCOUNTER — HOSPITAL ENCOUNTER (EMERGENCY)
Facility: HOSPITAL | Age: 41
Discharge: HOME | End: 2021-01-24
Attending: HOSPITALIST
Payer: COMMERCIAL

## 2021-01-24 VITALS
SYSTOLIC BLOOD PRESSURE: 137 MMHG | WEIGHT: 275 LBS | HEIGHT: 67 IN | RESPIRATION RATE: 24 BRPM | HEART RATE: 69 BPM | TEMPERATURE: 97.7 F | DIASTOLIC BLOOD PRESSURE: 66 MMHG | OXYGEN SATURATION: 97 % | BODY MASS INDEX: 43.16 KG/M2

## 2021-01-24 DIAGNOSIS — I73.00 RAYNAUD'S PHENOMENON WITHOUT GANGRENE: Primary | ICD-10-CM

## 2021-01-24 LAB
A/G RATIO: 1.1 (ref 0.8–2)
ALBUMIN SERPL-MCNC: 3.9 G/DL (ref 3.4–4.8)
ALP BLD-CCNC: 137 U/L (ref 25–100)
ALT SERPL-CCNC: 12 U/L (ref 4–36)
ANION GAP SERPL CALCULATED.3IONS-SCNC: 10 MMOL/L (ref 3–16)
APTT: 26.3 SEC (ref 21.6–33.4)
AST SERPL-CCNC: 14 U/L (ref 8–33)
BASOPHILS ABSOLUTE: 0.1 K/UL (ref 0–0.1)
BASOPHILS RELATIVE PERCENT: 0.6 %
BILIRUB SERPL-MCNC: <0.2 MG/DL (ref 0.3–1.2)
BUN BLDV-MCNC: 13 MG/DL (ref 6–20)
CALCIUM SERPL-MCNC: 8.9 MG/DL (ref 8.5–10.5)
CHLORIDE BLD-SCNC: 101 MMOL/L (ref 98–107)
CO2: 25 MMOL/L (ref 20–30)
CREAT SERPL-MCNC: 0.6 MG/DL (ref 0.4–1.2)
EOSINOPHILS ABSOLUTE: 0.7 K/UL (ref 0–0.4)
EOSINOPHILS RELATIVE PERCENT: 4.2 %
GFR AFRICAN AMERICAN: >59
GFR NON-AFRICAN AMERICAN: >60
GLOBULIN: 3.6 G/DL
GLUCOSE BLD-MCNC: 140 MG/DL (ref 74–106)
HCT VFR BLD CALC: 35.6 % (ref 37–47)
HEMOGLOBIN: 10.5 G/DL (ref 11.5–16.5)
IMMATURE GRANULOCYTES #: 0.2 K/UL
IMMATURE GRANULOCYTES %: 1.1 % (ref 0–5)
INR BLD: 1.02 (ref 0.87–1.14)
LYMPHOCYTES ABSOLUTE: 2 K/UL (ref 1.5–4)
LYMPHOCYTES RELATIVE PERCENT: 12.8 %
MCH RBC QN AUTO: 23.2 PG (ref 27–32)
MCHC RBC AUTO-ENTMCNC: 29.5 G/DL (ref 31–35)
MCV RBC AUTO: 78.6 FL (ref 80–100)
MONOCYTES ABSOLUTE: 0.7 K/UL (ref 0.2–0.8)
MONOCYTES RELATIVE PERCENT: 4.4 %
NEUTROPHILS ABSOLUTE: 12 K/UL (ref 2–7.5)
NEUTROPHILS RELATIVE PERCENT: 76.9 %
PDW BLD-RTO: 15.5 % (ref 11–16)
PLATELET # BLD: 872 K/UL (ref 150–400)
PMV BLD AUTO: 9.1 FL (ref 6–10)
POTASSIUM REFLEX MAGNESIUM: 3.9 MMOL/L (ref 3.4–5.1)
PROTHROMBIN TIME: 13.3 SEC (ref 11.7–14.6)
RBC # BLD: 4.53 M/UL (ref 3.8–5.8)
SODIUM BLD-SCNC: 136 MMOL/L (ref 136–145)
TOTAL PROTEIN: 7.5 G/DL (ref 6.4–8.3)
WBC # BLD: 15.6 K/UL (ref 4–11)

## 2021-01-24 PROCEDURE — 80053 COMPREHEN METABOLIC PANEL: CPT

## 2021-01-24 PROCEDURE — 85610 PROTHROMBIN TIME: CPT

## 2021-01-24 PROCEDURE — 85025 COMPLETE CBC W/AUTO DIFF WBC: CPT

## 2021-01-24 PROCEDURE — 85730 THROMBOPLASTIN TIME PARTIAL: CPT

## 2021-01-24 PROCEDURE — 36415 COLL VENOUS BLD VENIPUNCTURE: CPT

## 2021-01-24 PROCEDURE — 99282 EMERGENCY DEPT VISIT SF MDM: CPT

## 2021-01-24 RX ORDER — DOCUSATE SODIUM 100 MG/1
100 CAPSULE, LIQUID FILLED ORAL 2 TIMES DAILY
COMMUNITY

## 2021-01-24 RX ORDER — IBUPROFEN 600 MG/1
600 TABLET ORAL EVERY 6 HOURS PRN
COMMUNITY

## 2021-01-24 RX ORDER — ONDANSETRON 4 MG/1
4 TABLET, FILM COATED ORAL EVERY 8 HOURS PRN
COMMUNITY

## 2021-01-24 RX ORDER — OXYCODONE HYDROCHLORIDE AND ACETAMINOPHEN 5; 325 MG/1; MG/1
1 TABLET ORAL EVERY 4 HOURS PRN
COMMUNITY

## 2021-01-24 RX ORDER — FERROUS SULFATE 325(65) MG
325 TABLET ORAL
COMMUNITY

## 2021-01-24 ASSESSMENT — PAIN DESCRIPTION - DESCRIPTORS: DESCRIPTORS: NUMBNESS;TINGLING

## 2021-01-24 ASSESSMENT — PAIN DESCRIPTION - PROGRESSION: CLINICAL_PROGRESSION: GRADUALLY WORSENING

## 2021-01-24 ASSESSMENT — PAIN DESCRIPTION - ONSET: ONSET: SUDDEN

## 2021-01-24 ASSESSMENT — PAIN SCALES - GENERAL: PAINLEVEL_OUTOF10: 3

## 2021-01-24 ASSESSMENT — PAIN DESCRIPTION - PAIN TYPE: TYPE: ACUTE PAIN

## 2021-01-24 ASSESSMENT — PAIN DESCRIPTION - LOCATION: LOCATION: HAND

## 2021-01-24 NOTE — ED TRIAGE NOTES
Patient complains of hands turning color for the past couple days and is now starting to hurt / numbness. Left worse than right. Patient had recent hysterectomy on Monday.

## 2021-01-24 NOTE — ED PROVIDER NOTES
[facility] ENCOUNTER      Pt Name: Johanne Mckenna  MRN: [id number]  YOB: 1980  Date of evaluation: 1/24/2021  Provider: Michelle Crum, [address] [place] [address]       Chief Complaint   Patient presents with    Other         HISTORY OF PRESENT ILLNESS  (Location/Symptom, Timing/Onset, Context/Setting, Quality, Duration, Modifying Factors, Severity.)   Johanne Mckenna is a 41 y.o. female who presents to the emergency department for to the fingertips of the left hand. Patient states that she had a hysterectomy performed on Monday of this week in [place]. She follows with her OB/GYN on 3 February. She has had no difficulties from the surgery itself. However she states that she started to have some pain with color change to the tips of her fingers on her left hand. She states that she has pain and throbbing to both hands but only had a color change to the tips of the fingers on the left hand. Patient states that the symptoms did worsen with cold exposure and temperature change. She denies any other complaints at this time. Denies any chest pain or shortness of breath. Denies any nausea vomiting or diarrhea. Denies any abdominal pain. Denies any trauma or injury to the area of the hands and cells. She states she did have an IV in that arm and she was concerned for possible blood clot or DVT because of the symptoms. However patient denies any swelling or redness to the hands or arms. Denies any pain beyond the hands and wrists himself from the throbbing sensation. States that her fingertips are tender to touch. Nursing notes were reviewed.     REVIEW OFSYSTEMS    (2-9 systems for level 4, 10 or more for level 5)   ROS:  General:  No fevers, no chills, no weakness  Cardiovascular:  No chest pain, no palpitations  Respiratory:  No shortness of breath, no cough, no wheezing  Gastrointestinal:  No pain, no nausea, no vomiting, no diarrhea  Musculoskeletal:  No muscle pain, +bilateral hand/figertip pain-throbbibg  Skin:  No rash, no easy bruising, +color change to left fingertips  Neurologic:  No speech problems, no headache, no extremity weakness  Psychiatric:  No anxiety  Genitourinary:  No dysuria, no hematuria    Except as noted above the remainder of the review of systems was reviewed and negative. PAST MEDICAL HISTORY     Past Medical History:   Diagnosis Date    Gall stones     Hx of transfusion of whole blood          SURGICAL HISTORY       Past Surgical History:   Procedure Laterality Date    CHOLECYSTECTOMY      DILATION AND CURETTAGE OF UTERUS      HYSTERECTOMY      SPLENECTOMY, TOTAL           CURRENT MEDICATIONS       Previous Medications    DOCUSATE SODIUM (COLACE) 100 MG CAPSULE    Take 100 mg by mouth 2 times daily    FERROUS SULFATE (IRON 325) 325 (65 FE) MG TABLET    Take 325 mg by mouth daily (with breakfast) Unsure of medication    IBUPROFEN (ADVIL;MOTRIN) 600 MG TABLET    Take 600 mg by mouth every 6 hours as needed for Pain    ONDANSETRON (ZOFRAN) 4 MG TABLET    Take 4 mg by mouth every 8 hours as needed for Nausea or Vomiting    OXYCODONE-ACETAMINOPHEN (PERCOCET) 5-325 MG PER TABLET    Take 1 tablet by mouth every 4 hours as needed for Pain. ALLERGIES     Patient has no known allergies.     FAMILY HISTORY       Family History   Problem Relation Age of Onset    Diabetes Father     Diabetes Sister           SOCIAL HISTORY       Social History     Socioeconomic History    Marital status:      Spouse name: None    Number of children: None    Years of education: None    Highest education level: None   Occupational History    None   Social Needs    Financial resource strain: None    Food insecurity     Worry: None     Inability: None    Transportation needs     Medical: None     Non-medical: None   Tobacco Use    Smoking status: Never Smoker    Smokeless tobacco: Never Used   Substance and Sexual Activity    Alcohol use: No    Drug use: Never    Sexual activity: None   Lifestyle    Physical activity     Days per week: None     Minutes per session: None    Stress: None   Relationships    Social connections     Talks on phone: None     Gets together: None     Attends [employer] service: None     Active member of club or organization: None     Attends meetings of clubs or organizations: None     Relationship status: None    Intimate partner violence     Fear of current or ex partner: None     Emotionally abused: None     Physically abused: None     Forced sexual activity: None   Other Topics Concern    None   Social History Narrative    None         PHYSICAL EXAM    (up to 7 for level 4, 8 or more for level 5)     ED Triage Vitals   BP Temp Temp src Pulse Resp SpO2 Height Weight   -- -- -- -- -- -- -- --       Physical Exam  General :Patient is awake, alert, oriented, in no acute distress, nontoxic appearing  HEENT: Pupils are equally round and reactive to light, EOMI, conjunctivae clear. Oral mucosa is moist, no exudate. Uvula is midline  Cardiac: Heart regular rate, rhythm, no murmurs, rubs, or gallops  Lungs: Lungs are clear to auscultation, there is no wheezing, rhonchi, or rales. There is no use of accessory muscles. Abdomen: Abdomen is soft, nontender, nondistended. There is no firm or pulsatile masses, no rebound rigidity or guarding. Musculoskeletal: 5 out of 5 strength in all 4 extremities. No focal muscle deficits are appreciated, range of motion is slightly decreased to the left hand with fingers is secondary to pain. She does have palpable tenderness to the tips of the finger and there is some purple discoloration especially to the second fourth and fifth tips of the fingers. They are cooler to touch than the remaining portion of the hand. There is no obvious color change noted on the right hand.   No actual decreased range of motion to the right hand itself no obvious findings to that hand. There is no swelling or erythema noted to the bilateral fingers hands or arms no concern for DVT at this time. Both radial pulses are strong and +2. Cap refill is less than 3 seconds on the right hand. Cap refill is slightly delayed in the tips of the fingers of the left hand at approximately 3-1/2 to 4 seconds. Just with passive warming of holding the patient's fingertips and my palms of her hands did improve her symptoms. And she did have improvement of color change towards normal during that time. Findings are consistent with Raynaud's phenomenon  Neuro: Motor intact, sensory intact, level of consciousness is normal, cerebellar function is normal, reflexes are grossly normal.   Dermatology: Skin is warm and dry  Psych: Mentation is grossly normal, cognition is grossly normal. Affect is appropriate.       DIAGNOSTIC RESULTS     EKG: All EKG's are interpreted by the Emergency Department Physician who either signs or Co-signs this chart in the [address] a cardiologist.        RADIOLOGY:   Non-plain film images such as CT, Ultrasound and MRI are read by the radiologist. Plain radiographic images are visualized and preliminarily interpreted by the emergency physician with the below findings:      ? Radiologist's Report Reviewed:  No orders to display         ED BEDSIDE ULTRASOUND:   Performed by ED Physician - none    LABS:    I have reviewed and interpreted all of the currently available lab results from this visit (ifapplicable):  Results for orders placed or performed during the hospital encounter of 01/24/21   CBC Auto Differential   Result Value Ref Range    WBC 15.6 (H) 4.0 - 11.0 K/uL    RBC 4.53 3.80 - 5.80 M/uL    Hemoglobin 10.5 (L) 11.5 - 16.5 g/dL    Hematocrit 35.6 (L) 37.0 - 47.0 %    MCV 78.6 (L) 80.0 - 100.0 fL    MCH 23.2 (L) 27.0 - 32.0 pg    MCHC 29.5 (L) 31.0 - 35.0 g/dL    RDW 15.5 11.0 - 16.0 %    Platelets 168 (H) 052 - 400 K/uL    MPV 9.1 6.0 - 10.0 fL    Neutrophils % 76.9 check some coags such as pro time/INR and APTT. Advised that her symptoms are consistent with Raynaud's phenomenon passive warming may also help with her symptoms. Advised that if this continues and she has more episodes of this then she may require being placed on certain types of antihypertensive medication such as calcium channel blockers or even topical nitrates can sometimes help with the symptoms. At this time she did have some improvement of her symptoms with just passive warming. Patient be given warm packs at this time while here. Advised to stay away from abrupt temperature changes make sure that she has insulated her extremities well against exposure. Stress reactions can also cause similar symptoms but hers do seem to be exacerbated with cold temperatures. Patient's resting company stretcher no acute distress nontoxic-appearing. Patient's final disposition will be determined once her diagnostic studies been performed reviewed. Blood work showed white counts 15,600, hemoglobin is 10.5, hematocrit 35.6, platelet counts 596. Comprehensive metabolic panel was completely benign except for glucose of 140. Alkaline phosphatase is mildly elevated 137. Pro time was normal at 13.3, INR normal at 1.02 and APTT normal at 26.3. Patient's diagnostic studies were discussed with her she does state her understanding. Patient advised the changes on her blood work specially with her CBC would be consistent with postop surgery changes she does have changes and some mild inflammatory markers. States her understanding. Advised that she does need to follow-up with her regular family physician especially within the next couple of days and especially if the symptoms continue because she may require treatment with either a calcium channel blockers or sometimes topical nitrates may help. She would also need further evaluation for primary versus secondary Raynaud's phenomenon.   Otherwise the patient's symptoms have improved here with the use of the heating pad and warming the area. Advised use Tylenol Motrin for the pain. Also advised to insulate and make sure that she does not have a cold exposures to the area which can exacerbate her symptoms and actually started the symptoms at this time around. The patient advised that she does need to follow-up with her regular family physician within the next 1 to 2 days reevaluation. Patient was also given instructions that if her symptoms worsens or new symptoms arise she should return back to emergency department for further evaluation work-up. CONSULTS:  None    PROCEDURES:  Procedures    CRITICAL CARE TIME    Total Critical Care time was 0 minutes, excluding separately reportable procedures. There was a high probability of clinically significant/life threatening deterioration in the patient's condition which required my urgent intervention. FINAL IMPRESSION      1. Raynaud's phenomenon without gangrene          DISPOSITION/PLAN   DISPOSITION        PATIENT REFERRED TO:  Goran Barnett PA-C  [address] [place] [address] [id number]    In 2 days      [facility] Emergency Department  [facility] [address] [facility]  [phone]    As needed, If symptoms worsen      DISCHARGE MEDICATIONS:  New Prescriptions    No medications on file       Comment: Please note this report has been produced using speech recognition software and may contain errorsrelated to that system including errors in grammar, punctuation, and spelling, as well as words and phrases that may be inappropriate. If there are any questions or concerns please feel free to contact the dictating providerfor clarification.     De Greenwood DO  Attending Emergency Physician              De Greenwood DO  01/24/21 4495

## 2021-01-24 NOTE — ED NOTES
AVS reviewed with patient, understanding verbalized. Patient discharged home with no further needs or concerns voiced. pcp follow up recommended.      Rustam Morton RN  01/24/21 0092

## 2021-02-01 NOTE — PROGRESS NOTES
"Destiny Soto  [id number]  1980      Reason for Visit: Postoperative evaluation    History of Present Illness:  Patient is a very pleasant 40 y.o. woman who presents for a post operative evaluation status post TYPE ONE RADICAL HYSTERECTOMY BILATERAL SALPINGOOPHORECTOMY WITH DAVINCI ROBOT,BILATERAL PELVIC  SENTINEL LYMPH NODE DISSECTION performed on 1/18/2021.      Surgery and hospital course were uncomplicated.  Patient endorses pain and weakness in her index and middle finger on the left hand associated with some blue discoloration.  She says this has been present since the surgery and is a burning, sharp pain.  She cannot make a strong fist.  The pain concerned her enough to present to ED last Sunday where she was told she has Raynaud syndrome and should follow up with her PCP.  She then saw her PCP, Brittanie Crump, a week ago and was told that it is not Raynaud and she should see a specialist if it does not improve.  Patient's only other complaint is some constipation relieved by Miralax and milk of magnesium.  No vaginal bleeding or pain.     Past Medical History, Past Surgical History, Social History, Family History have been reviewed and are without significant changes except as mentioned.    Review of Systems   All other systems were reviewed and are negative except as mentioned above.    Medications:  The current medication list was reviewed in the EMR    ALLERGIES:  No Known Allergies        /80 Comment: Right Wrist  Pulse 70   Temp 95.7 °F (35.4 °C) (Infrared)   Resp 18   Ht 160 cm (63\")   Wt 129 kg (285 lb)   SpO2 96% Comment: RA  BMI 50.49 kg/m²        Physical Exam  Constitutional:  Patient is a pleasant woman in no acute distress.  Gastrointestinal: Abdomen is soft and appropriately tender.  There is no mass palpated.  Yeast-appearing rash noted under pannis. There is no rebound or guarding.  Incision(s) is clean, dry and intact.  Extremities:  Bilateral lower extremities are " non-tender. Left hand with 3/5 strength of the 2nd and 3rd digit.  Normal sensation.  IV site free from bruising, erythema, induration.  No palpable cord.  No swelling.  Gynecologic:External genitalia are free from lesion. On speculum examination, the vaginal cuff was intact and no lesions were appreciated.  On bimanual examination, no fullness was appreciated.  Uterus, cervix and adnexa were absent.  There was no significant tenderness.  Rectovaginal exam was deferred.      PATHOLOGY:  Final Diagnosis   1. LYMPH NODE, LEFT PELVIC, SENTINEL NODE EXCISION:  One lymph node negative for metastatic carcinoma supported by cytokeratin immunohistochemical stain with appropriate control (0/1).  2. LYMPH NODES, RIGHT PELVIC, SENTINEL NODE EXCISION:  Three lymph nodes negative for metastatic carcinoma supported by cytokeratin immunohistochemical stain with appropriate control (0/3).  3. UTERUS, CERVIX, BILATERAL OVARIES AND FALLOPIAN TUBES, TOTAL HYSTERECTOMY AND BILATERAL SALPINGO-OOPHORECTOMY:   Endometrioid adenocarcinoma, FIGO grade 2, myometrial invasion to a depth of 5 mm with a total myometrial thickness of 22 mm (measures on glass slide).  No involvement of lower uterine segment, cervical stroma, parametrium, or adnexa.   See tumor template for additional details.          ASSESSMENT/PLAN:  Destiny Soto returns for a post-operative evaluation today.  All pathology reports were discussed with the patient.  I recommended observation for this early endometrial cancer.  Encounter Diagnoses   Name Primary?   • Post-operative state Yes   • Neuropathic pain of hand, left    • Class 3 severe obesity due to excess calories without serious comorbidity with body mass index (BMI) of 50.0 to 59.9 in adult (CMS/HCC)    • Endometrial cancer (CMS/HCC)          Hand weakness, pain  - pain and weakness in the 2nd and 3rd digit of the left hand, likely due to nerve compression during surgery. Will make referral to neurology and  physical therapy  -Discussed positioning at the time of surgery and need for Trendelenburg positioning to facilitate procedure, efforts made to minimize trauma to patient (foam cushioning), and likelihood of a postoperative neuropathy related to positioning or IV placement.  Discussed obesity could be a contributing factor.  Patient did have an IV at the left radial aspect of the arm just proximal to the wrist.  Denies prior history of carpal tunnel.  Patient was encouraged the most neuropathies do resolve after surgery but some can take a prolonged recovery course.    Obesity  -Patient is anxious to return to her weight loss efforts.  She was encouraged to walk before considering more strenuous activities.  Patient was counseled not to perform heavy lifting until 6 weeks postoperatively.    Rash  - Yeast dermatitis noted under pannis.  Patient counseled on keeping area clean and dry, using a cool hair dryer after showering.    - prescription for Nystatin powder sent to pharmacy     She is to follow-up for survivorship in 3 months time.    Ruth Mistry MD  02/03/21  14:49 EST

## 2021-02-03 ENCOUNTER — OFFICE VISIT (OUTPATIENT)
Dept: GYNECOLOGIC ONCOLOGY | Facility: CLINIC | Age: 41
End: 2021-02-03

## 2021-02-03 VITALS
OXYGEN SATURATION: 96 % | SYSTOLIC BLOOD PRESSURE: 170 MMHG | RESPIRATION RATE: 18 BRPM | HEIGHT: 63 IN | TEMPERATURE: 95.7 F | WEIGHT: 285 LBS | HEART RATE: 70 BPM | DIASTOLIC BLOOD PRESSURE: 80 MMHG | BODY MASS INDEX: 50.5 KG/M2

## 2021-02-03 DIAGNOSIS — E66.01 CLASS 3 SEVERE OBESITY DUE TO EXCESS CALORIES WITHOUT SERIOUS COMORBIDITY WITH BODY MASS INDEX (BMI) OF 50.0 TO 59.9 IN ADULT (HCC): ICD-10-CM

## 2021-02-03 DIAGNOSIS — C54.1 ENDOMETRIAL CANCER (HCC): ICD-10-CM

## 2021-02-03 DIAGNOSIS — M79.2 NEUROPATHIC PAIN OF HAND, LEFT: ICD-10-CM

## 2021-02-03 DIAGNOSIS — Z98.890 POST-OPERATIVE STATE: Primary | ICD-10-CM

## 2021-02-03 LAB
CYTO UR: NORMAL
LAB AP CASE REPORT: NORMAL
LAB AP CLINICAL INFORMATION: NORMAL
LAB AP SPECIAL STAINS: NORMAL
PATH REPORT.ADDENDUM SPEC: NORMAL
PATH REPORT.FINAL DX SPEC: NORMAL
PATH REPORT.GROSS SPEC: NORMAL

## 2021-02-03 PROCEDURE — 99024 POSTOP FOLLOW-UP VISIT: CPT | Performed by: OBSTETRICS & GYNECOLOGY

## 2021-02-03 RX ORDER — OXYCODONE HYDROCHLORIDE AND ACETAMINOPHEN 5; 325 MG/1; MG/1
1 TABLET ORAL
COMMUNITY
End: 2021-05-03

## 2021-02-03 RX ORDER — NYSTATIN 100000 [USP'U]/G
POWDER TOPICAL 2 TIMES DAILY PRN
Qty: 1 EACH | Refills: 6 | Status: SHIPPED | OUTPATIENT
Start: 2021-02-03

## 2021-02-03 RX ORDER — IBUPROFEN 800 MG/1
800 TABLET ORAL 3 TIMES DAILY PRN
COMMUNITY
Start: 2021-01-26 | End: 2021-02-03 | Stop reason: DRUGHIGH

## 2021-02-04 ENCOUNTER — TELEPHONE (OUTPATIENT)
Dept: ONCOLOGY | Facility: CLINIC | Age: 41
End: 2021-02-04

## 2021-02-04 NOTE — TELEPHONE ENCOUNTER
Caller: TANNER    Relationship to patient: [facility]    Best call back number: [phone]      CALLING TO SPEAK TO ALONDRA IN REGARD TO PT'S REFERRAL TO THEIR OFFICE. PLEASE CALL BACK AS SOON AS POSSIBLE. ALSO REQUESTING THE ORDER FOR THERAPY BE FAXED TO THEM    FX: [phone]

## 2021-03-24 ENCOUNTER — TELEPHONE (OUTPATIENT)
Dept: GYNECOLOGIC ONCOLOGY | Facility: CLINIC | Age: 41
End: 2021-03-24

## 2021-03-24 NOTE — TELEPHONE ENCOUNTER
Albino, Nurse , with [employer].    He needs treatment plan for pt.    [phone] Phone  [phone] Fax

## 2021-04-30 PROBLEM — Z98.890 POST-OPERATIVE STATE: Status: RESOLVED | Noted: 2021-02-03 | Resolved: 2021-04-30

## 2021-04-30 PROBLEM — N92.1 MENORRHAGIA WITH IRREGULAR CYCLE: Status: RESOLVED | Noted: 2020-12-17 | Resolved: 2021-04-30

## 2021-05-03 ENCOUNTER — CLINICAL SUPPORT (OUTPATIENT)
Dept: GYNECOLOGIC ONCOLOGY | Facility: CLINIC | Age: 41
End: 2021-05-03

## 2021-05-03 VITALS
WEIGHT: 288.7 LBS | BODY MASS INDEX: 51.15 KG/M2 | SYSTOLIC BLOOD PRESSURE: 140 MMHG | HEIGHT: 63 IN | TEMPERATURE: 97.3 F | DIASTOLIC BLOOD PRESSURE: 86 MMHG | RESPIRATION RATE: 17 BRPM | OXYGEN SATURATION: 98 %

## 2021-05-03 DIAGNOSIS — E66.01 CLASS 3 SEVERE OBESITY DUE TO EXCESS CALORIES WITHOUT SERIOUS COMORBIDITY WITH BODY MASS INDEX (BMI) OF 50.0 TO 59.9 IN ADULT (HCC): ICD-10-CM

## 2021-05-03 DIAGNOSIS — N63.20 LEFT BREAST LUMP: ICD-10-CM

## 2021-05-03 DIAGNOSIS — E89.41 HOT FLASHES DUE TO SURGICAL MENOPAUSE: ICD-10-CM

## 2021-05-03 DIAGNOSIS — C54.1 ENDOMETRIAL CANCER (HCC): Primary | ICD-10-CM

## 2021-05-03 DIAGNOSIS — F32.9 REACTIVE DEPRESSION: ICD-10-CM

## 2021-05-03 PROCEDURE — 99215 OFFICE O/P EST HI 40 MIN: CPT | Performed by: NURSE PRACTITIONER

## 2021-05-03 RX ORDER — VENLAFAXINE HYDROCHLORIDE 37.5 MG/1
37.5 CAPSULE, EXTENDED RELEASE ORAL DAILY
Qty: 30 CAPSULE | Refills: 5 | Status: SHIPPED | OUTPATIENT
Start: 2021-05-03

## 2021-05-07 DIAGNOSIS — N63.20 LEFT BREAST LUMP: Primary | ICD-10-CM

## 2022-02-07 ENCOUNTER — CONSULT (OUTPATIENT)
Dept: ONCOLOGY | Facility: CLINIC | Age: 42
End: 2022-02-07

## 2022-02-07 VITALS
HEIGHT: 63 IN | RESPIRATION RATE: 16 BRPM | SYSTOLIC BLOOD PRESSURE: 155 MMHG | TEMPERATURE: 97.3 F | WEIGHT: 293 LBS | BODY MASS INDEX: 51.91 KG/M2 | HEART RATE: 83 BPM | DIASTOLIC BLOOD PRESSURE: 88 MMHG | OXYGEN SATURATION: 97 %

## 2022-02-07 DIAGNOSIS — D72.9 NEUTROPHILIC LEUKOCYTOSIS: Primary | ICD-10-CM

## 2022-02-07 DIAGNOSIS — Z90.81 HX OF SPLENECTOMY: ICD-10-CM

## 2022-02-07 DIAGNOSIS — D75.838 POSTSPLENECTOMY THROMBOCYTOSIS: ICD-10-CM

## 2022-02-07 DIAGNOSIS — D58.0 SPHEROCYTOSIS (FAMILIAL): ICD-10-CM

## 2022-02-07 DIAGNOSIS — Z90.81 POSTSPLENECTOMY THROMBOCYTOSIS: ICD-10-CM

## 2022-02-07 PROCEDURE — 99213 OFFICE O/P EST LOW 20 MIN: CPT | Performed by: INTERNAL MEDICINE

## 2022-02-07 NOTE — PROGRESS NOTES
ID: 41 y.o. year old female from [facility] [address]    PCP: Brittanie Crump PA    REFERRING PHYSICIAN: DAVID Vail    Reason for Consultation: Persistent leukocytosis and thrombocytosis    Dear Ms. Parham    It is a pleasure to meet Ms. Soto today.  She is a very pleasant 41-year-old lady who presents today for consultation for persistent leukocytosis and thrombocytosis.  She has a history of splenectomy due to a congenital spherocytosis.  She has had a cholecystectomy due to gallstones.  She also has had a hysterectomy due to endometrial cancer.  She has morbid obesity.  She seems to be doing reasonably well otherwise.      Past Medical History:   Diagnosis Date   • Depression    • Endometrial cancer (HCC)    • Female infertility    • Hiatal hernia    • History of transfusion 1983    WITH SPLENECTOMY    • Migraine    • Spherocytosis (HCC)    • Urinary tract infection        Past Surgical History:   Procedure Laterality Date   • D & C HYSTEROSCOPY N/A 12/21/2020    Procedure: DILATATION AND CURETTAGE  DIAGNOSTIC HYSTEROSCOPY;  Surgeon: Jacquelin Hogue MD;  Location: [facility];  Service: Obstetrics/Gynecology;  Laterality: N/A;   • LAPAROSCOPIC CHOLECYSTECTOMY     • SPLENECTOMY      AT AGE 3 FROM SPHEROCYTOSIS   • TOTAL LAPAROSCOPIC HYSTERECTOMY SALPINGO OOPHORECTOMY N/A 1/18/2021    Procedure: TYPE ONE RADICAL HYSTERECTOMY BILATERAL SALPINGOOPHORECTOMY WITH DAVINCI ROBOT,BILATERAL PELVIC  SENTINEL LYMPH NODE DISSECTION;  Surgeon: Ruth Mistry MD;  Location: [facility];  Service: [facility];  Laterality: N/A;       Social History     Socioeconomic History   • Marital status:    Tobacco Use   • Smoking status: Never Smoker   • Smokeless tobacco: Never Used   Vaping Use   • Vaping Use: Never used   Substance and Sexual Activity   • Alcohol use: Never   • Drug use: Never   • Sexual activity: Yes     Partners: Male     Birth control/protection: None       Family History   Problem Relation  Age of Onset   • Cancer Mother        Review of Systems:    16 point review of systems was performed and reviewed and scanned into the EMR    Review of Systems - Oncology      Current Outpatient Medications:   •  ferrous sulfate 325 (65 FE) MG tablet, Take 1 tablet by mouth 2 (Two) Times a Day With Meals., Disp: 60 tablet, Rfl: 1  •  ibuprofen (ADVIL,MOTRIN) 600 MG tablet, Take 1 tablet by mouth Every 6 (Six) Hours As Needed for Mild Pain ., Disp: 30 tablet, Rfl: 0  •  nystatin (MYCOSTATIN) 518482 UNIT/GM powder, Apply  topically to the appropriate area as directed 2 (Two) Times a Day As Needed (for rash)., Disp: 1 each, Rfl: 6  •  ondansetron (ZOFRAN) 4 MG tablet, Take 1 tablet by mouth Every 6 (Six) Hours As Needed for Nausea or Vomiting., Disp: 10 tablet, Rfl: 3  •  venlafaxine XR (Effexor XR) 37.5 MG 24 hr capsule, Take 1 capsule by mouth Daily., Disp: 30 capsule, Rfl: 5    Pain Medications             ibuprofen (ADVIL,MOTRIN) 600 MG tablet Take 1 tablet by mouth Every 6 (Six) Hours As Needed for Mild Pain .    venlafaxine XR (Effexor XR) 37.5 MG 24 hr capsule Take 1 capsule by mouth Daily.           No Known Allergies      ECOG score: 0           Objective     Vitals:    02/07/22 1301   BP: 155/88   Pulse: 83   Resp: 16   Temp: 97.3 °F (36.3 °C)   SpO2: 97%     Body mass index is 52.74 kg/m².  Body surface area is 2.29 meters squared.        02/07/22  1301   Weight: 135 kg (297 lb 11.2 oz)     Pain Score    02/07/22 1301   PainSc: 0-No pain          Physical Exam    General: well appearing, in no acute distress  HEENT: sclera anicteric, oropharynx clear, neck is supple  Lymphatics: no cervical, supraclavicular, or axillary adenopathy  Cardiovascular: regular rate and rhythm, no murmurs, rubs or gallops  Lungs: clear to auscultation bilaterally  Abdomen: soft, nontender, nondistended.  No palpable organomegaly  Extremities: no lower extremity edema  Skin: no rashes, lesions, bruising, or petechiae  Msk:  Shows  no weakness of the large muscle groups  Psych: Mood is stable        Lab Results   Component Value Date    GLUCOSE 107 (H) 01/15/2021    BUN 12 01/15/2021    CREATININE 0.61 01/15/2021     01/15/2021    K 3.6 01/15/2021     01/15/2021    CO2 29.0 01/15/2021    CALCIUM 8.5 (L) 01/15/2021    PROTEINTOT 8.4 01/15/2021    ALBUMIN 4.10 01/15/2021    BILITOT <0.2 01/15/2021    ALKPHOS 152 (H) 01/15/2021    AST 20 01/15/2021    ALT 12 01/15/2021               Assessment/Plan      1.  Persistent leukocytosis and thrombocytosis.  This is consistent for a patient with a splenectomy.  Usually these numbers are persistently elevated and do not correct.  No intervention is necessary here.    2.  Congenital spherocytosis with history of splenectomy    3.  Morbid obesity.  This may be also playing a role in her mild elevation in her white count.    4.  History of endometrial cancer s/p hysterectomy      Thank you for allowing me to participate in the care of this patient.    Yours sincerely,    Raudel Sam MD  [facility]  Hematology and Oncology         No orders of the defined types were placed in this encounter.

## (undated) DEVICE — RICH MINOR LITHOTOMY: Brand: MEDLINE INDUSTRIES, INC.

## (undated) DEVICE — ST TBG CONN PNEUMOCLEAR EVAC SMOKE HEAT/HUMID

## (undated) DEVICE — BLADELESS OBTURATOR: Brand: WECK VISTA

## (undated) DEVICE — ARM DRAPE

## (undated) DEVICE — GLV SURG SENSICARE PI LF PF 6.0

## (undated) DEVICE — KIT INCLUDES:GTB14, ALEXIS CONTAINED EXTRACTION SYSTEMC0R47, 12X100 KII BALLOON BLUNT TIP TROCAR: Brand: ALEXIS CONTAINED EXTRACTION SYSTEM WITH KII BALLOON BLUNT TIP SYSTEM

## (undated) DEVICE — MANIP UTER RUMI TP 6.7MM 10CM GRN

## (undated) DEVICE — MANIP UTER RUMI 2 KOH EFFICIENT 3CM BL

## (undated) DEVICE — PAD ARMBRD SURG CONVOL 7.5X20X2IN

## (undated) DEVICE — UNDERGLV SURG BIOGEL INDICAT PF 61/2 GRN

## (undated) DEVICE — SOL NACL 0.9PCT 1000ML

## (undated) DEVICE — SUT MNCRYL PLS ANTIB UD 3/0 PS2 27IN

## (undated) DEVICE — ANTIBACTERIAL UNDYED BRAIDED (POLYGLACTIN 910), SYNTHETIC ABSORBABLE SUTURE: Brand: COATED VICRYL

## (undated) DEVICE — INTENDED FOR TISSUE SEPARATION, AND OTHER PROCEDURES THAT REQUIRE A SHARP SURGICAL BLADE TO PUNCTURE OR CUT.: Brand: BARD-PARKER ® STAINLESS STEEL BLADES

## (undated) DEVICE — ANTIBACTERIAL UNDYED BRAIDED (POLYGLACTIN 910), SYNTHETIC ABSORBABLE SURGICAL SUTURE: Brand: COATED VICRYL

## (undated) DEVICE — ST IRR CYSTO W/SPK 77IN LF

## (undated) DEVICE — SYR LUERLOK 50ML

## (undated) DEVICE — APPL CHLORAPREP TINTED 26ML TEAL

## (undated) DEVICE — GLV SURG BIOGEL M LTX PF 6 1/2

## (undated) DEVICE — PK MAJ GYN DAVINCI 10

## (undated) DEVICE — SKIN AFFIX SURG ADHESIVE 72/CS 0.55ML: Brand: MEDLINE

## (undated) DEVICE — SHEET, DRAPE, SPLIT, STERILE: Brand: MEDLINE

## (undated) DEVICE — SUT GUT CHRM 2/0 SH 27IN G123H

## (undated) DEVICE — CVR HNDL LIGHT RIGID

## (undated) DEVICE — COLUMN DRAPE

## (undated) DEVICE — TIP COVER ACCESSORY

## (undated) DEVICE — DRP ADAPT ALLY UTER POSTN SYS 1P/U

## (undated) DEVICE — CANNULA SEAL

## (undated) DEVICE — TISSUE RETRIEVAL SYSTEM: Brand: INZII RETRIEVAL SYSTEM

## (undated) DEVICE — NDL BLNT 18G 1 1/2IN

## (undated) DEVICE — BOWL UTIL STRL 32OZ